# Patient Record
Sex: FEMALE | Race: OTHER | HISPANIC OR LATINO
[De-identification: names, ages, dates, MRNs, and addresses within clinical notes are randomized per-mention and may not be internally consistent; named-entity substitution may affect disease eponyms.]

---

## 2017-10-17 ENCOUNTER — ASOB RESULT (OUTPATIENT)
Age: 47
End: 2017-10-17

## 2017-10-17 ENCOUNTER — APPOINTMENT (OUTPATIENT)
Dept: ANTEPARTUM | Facility: CLINIC | Age: 47
End: 2017-10-17
Payer: COMMERCIAL

## 2017-10-17 PROBLEM — Z00.00 ENCOUNTER FOR PREVENTIVE HEALTH EXAMINATION: Status: ACTIVE | Noted: 2017-10-17

## 2017-10-17 PROCEDURE — 76830 TRANSVAGINAL US NON-OB: CPT

## 2017-10-17 PROCEDURE — 76857 US EXAM PELVIC LIMITED: CPT

## 2019-08-12 ENCOUNTER — TRANSCRIPTION ENCOUNTER (OUTPATIENT)
Age: 49
End: 2019-08-12

## 2019-08-13 ENCOUNTER — OUTPATIENT (OUTPATIENT)
Dept: OUTPATIENT SERVICES | Facility: HOSPITAL | Age: 49
LOS: 1 days | End: 2019-08-13
Payer: COMMERCIAL

## 2019-08-13 DIAGNOSIS — M54.16 RADICULOPATHY, LUMBAR REGION: ICD-10-CM

## 2019-08-13 PROCEDURE — 64483 NJX AA&/STRD TFRM EPI L/S 1: CPT | Mod: LT

## 2019-08-13 PROCEDURE — 76000 FLUOROSCOPY <1 HR PHYS/QHP: CPT

## 2019-08-13 PROCEDURE — 64484 NJX AA&/STRD TFRM EPI L/S EA: CPT | Mod: LT

## 2019-09-16 ENCOUNTER — TRANSCRIPTION ENCOUNTER (OUTPATIENT)
Age: 49
End: 2019-09-16

## 2019-09-17 ENCOUNTER — OUTPATIENT (OUTPATIENT)
Dept: OUTPATIENT SERVICES | Facility: HOSPITAL | Age: 49
LOS: 1 days | Discharge: HOME | End: 2019-09-17
Payer: COMMERCIAL

## 2019-09-17 DIAGNOSIS — M47.816 SPONDYLOSIS WITHOUT MYELOPATHY OR RADICULOPATHY, LUMBAR REGION: ICD-10-CM

## 2019-09-17 PROCEDURE — 64494 INJ PARAVERT F JNT L/S 2 LEV: CPT | Mod: LT

## 2019-09-17 PROCEDURE — 76000 FLUOROSCOPY <1 HR PHYS/QHP: CPT

## 2019-09-17 PROCEDURE — 64495 INJ PARAVERT F JNT L/S 3 LEV: CPT | Mod: LT

## 2019-09-17 PROCEDURE — 64493 INJ PARAVERT F JNT L/S 1 LEV: CPT | Mod: LT

## 2019-10-28 ENCOUNTER — TRANSCRIPTION ENCOUNTER (OUTPATIENT)
Age: 49
End: 2019-10-28

## 2019-10-29 ENCOUNTER — OUTPATIENT (OUTPATIENT)
Dept: OUTPATIENT SERVICES | Facility: HOSPITAL | Age: 49
LOS: 1 days | End: 2019-10-29
Payer: COMMERCIAL

## 2019-10-29 DIAGNOSIS — M54.16 RADICULOPATHY, LUMBAR REGION: ICD-10-CM

## 2019-10-29 PROCEDURE — 64483 NJX AA&/STRD TFRM EPI L/S 1: CPT | Mod: RT

## 2019-10-29 PROCEDURE — 76000 FLUOROSCOPY <1 HR PHYS/QHP: CPT

## 2019-10-29 PROCEDURE — 64484 NJX AA&/STRD TFRM EPI L/S EA: CPT | Mod: RT

## 2019-11-18 PROBLEM — M47.814 THORACIC SPONDYLOSIS: Status: ACTIVE | Noted: 2019-11-18

## 2019-11-18 PROBLEM — Z00.00 ENCOUNTER FOR PREVENTIVE HEALTH EXAMINATION: Status: ACTIVE | Noted: 2019-11-18

## 2019-11-19 ENCOUNTER — APPOINTMENT (OUTPATIENT)
Dept: ORTHOPEDIC SURGERY | Facility: CLINIC | Age: 49
End: 2019-11-19
Payer: COMMERCIAL

## 2019-11-19 VITALS
DIASTOLIC BLOOD PRESSURE: 75 MMHG | BODY MASS INDEX: 27.6 KG/M2 | HEIGHT: 62 IN | HEART RATE: 67 BPM | WEIGHT: 150 LBS | SYSTOLIC BLOOD PRESSURE: 118 MMHG

## 2019-11-19 DIAGNOSIS — Z90.711 ACQUIRED ABSENCE OF UTERUS WITH REMAINING CERVICAL STUMP: ICD-10-CM

## 2019-11-19 DIAGNOSIS — M47.814 SPONDYLOSIS W/OUT MYELOPATHY OR RADICULOPATHY, THORACIC REGION: ICD-10-CM

## 2019-11-19 PROCEDURE — 99204 OFFICE O/P NEW MOD 45 MIN: CPT

## 2019-11-19 PROCEDURE — 72100 X-RAY EXAM L-S SPINE 2/3 VWS: CPT

## 2019-12-10 ENCOUNTER — FORM ENCOUNTER (OUTPATIENT)
Age: 49
End: 2019-12-10

## 2019-12-11 ENCOUNTER — OUTPATIENT (OUTPATIENT)
Dept: OUTPATIENT SERVICES | Facility: HOSPITAL | Age: 49
LOS: 1 days | End: 2019-12-11
Payer: COMMERCIAL

## 2019-12-11 ENCOUNTER — APPOINTMENT (OUTPATIENT)
Dept: MRI IMAGING | Facility: CLINIC | Age: 49
End: 2019-12-11
Payer: COMMERCIAL

## 2019-12-11 DIAGNOSIS — M43.16 SPONDYLOLISTHESIS, LUMBAR REGION: ICD-10-CM

## 2019-12-11 DIAGNOSIS — M48.062 SPINAL STENOSIS, LUMBAR REGION WITH NEUROGENIC CLAUDICATION: ICD-10-CM

## 2019-12-11 PROCEDURE — 72148 MRI LUMBAR SPINE W/O DYE: CPT

## 2019-12-11 PROCEDURE — 72148 MRI LUMBAR SPINE W/O DYE: CPT | Mod: 26

## 2020-01-23 ENCOUNTER — APPOINTMENT (OUTPATIENT)
Dept: ORTHOPEDIC SURGERY | Facility: CLINIC | Age: 50
End: 2020-01-23
Payer: COMMERCIAL

## 2020-01-23 VITALS
HEIGHT: 62 IN | DIASTOLIC BLOOD PRESSURE: 83 MMHG | HEART RATE: 74 BPM | SYSTOLIC BLOOD PRESSURE: 125 MMHG | BODY MASS INDEX: 27.6 KG/M2 | WEIGHT: 150 LBS

## 2020-01-23 PROCEDURE — 99214 OFFICE O/P EST MOD 30 MIN: CPT

## 2020-01-23 PROCEDURE — 72100 X-RAY EXAM L-S SPINE 2/3 VWS: CPT

## 2020-03-05 ENCOUNTER — APPOINTMENT (OUTPATIENT)
Dept: ANTEPARTUM | Facility: CLINIC | Age: 50
End: 2020-03-05
Payer: COMMERCIAL

## 2020-03-05 ENCOUNTER — ASOB RESULT (OUTPATIENT)
Age: 50
End: 2020-03-05

## 2020-03-05 PROCEDURE — 76856 US EXAM PELVIC COMPLETE: CPT | Mod: 59

## 2020-03-05 PROCEDURE — 76830 TRANSVAGINAL US NON-OB: CPT

## 2020-03-09 ENCOUNTER — TRANSCRIPTION ENCOUNTER (OUTPATIENT)
Age: 50
End: 2020-03-09

## 2020-03-10 ENCOUNTER — OUTPATIENT (OUTPATIENT)
Dept: OUTPATIENT SERVICES | Facility: HOSPITAL | Age: 50
LOS: 1 days | End: 2020-03-10
Payer: COMMERCIAL

## 2020-03-10 DIAGNOSIS — M54.16 RADICULOPATHY, LUMBAR REGION: ICD-10-CM

## 2020-03-16 PROCEDURE — 64483 NJX AA&/STRD TFRM EPI L/S 1: CPT | Mod: LT

## 2020-03-16 PROCEDURE — 76000 FLUOROSCOPY <1 HR PHYS/QHP: CPT

## 2020-03-16 PROCEDURE — 64484 NJX AA&/STRD TFRM EPI L/S EA: CPT | Mod: LT

## 2020-06-16 ENCOUNTER — APPOINTMENT (OUTPATIENT)
Dept: ANTEPARTUM | Facility: CLINIC | Age: 50
End: 2020-06-16

## 2020-11-06 ENCOUNTER — APPOINTMENT (OUTPATIENT)
Dept: ORTHOPEDIC SURGERY | Facility: CLINIC | Age: 50
End: 2020-11-06
Payer: MEDICAID

## 2020-11-06 VITALS
DIASTOLIC BLOOD PRESSURE: 83 MMHG | WEIGHT: 150 LBS | HEART RATE: 68 BPM | SYSTOLIC BLOOD PRESSURE: 127 MMHG | BODY MASS INDEX: 27.6 KG/M2 | HEIGHT: 62 IN

## 2020-11-06 VITALS — TEMPERATURE: 96.8 F

## 2020-11-06 PROCEDURE — 99215 OFFICE O/P EST HI 40 MIN: CPT

## 2020-11-06 PROCEDURE — 99072 ADDL SUPL MATRL&STAF TM PHE: CPT

## 2020-12-04 DIAGNOSIS — Z78.9 OTHER SPECIFIED HEALTH STATUS: ICD-10-CM

## 2020-12-09 ENCOUNTER — APPOINTMENT (OUTPATIENT)
Dept: CT IMAGING | Facility: CLINIC | Age: 50
End: 2020-12-09
Payer: MEDICAID

## 2020-12-09 ENCOUNTER — OUTPATIENT (OUTPATIENT)
Dept: OUTPATIENT SERVICES | Facility: HOSPITAL | Age: 50
LOS: 1 days | End: 2020-12-09
Payer: COMMERCIAL

## 2020-12-09 DIAGNOSIS — M47.816 SPONDYLOSIS WITHOUT MYELOPATHY OR RADICULOPATHY, LUMBAR REGION: ICD-10-CM

## 2020-12-09 DIAGNOSIS — M43.16 SPONDYLOLISTHESIS, LUMBAR REGION: ICD-10-CM

## 2020-12-09 DIAGNOSIS — M48.062 SPINAL STENOSIS, LUMBAR REGION WITH NEUROGENIC CLAUDICATION: ICD-10-CM

## 2020-12-09 DIAGNOSIS — Z00.8 ENCOUNTER FOR OTHER GENERAL EXAMINATION: ICD-10-CM

## 2020-12-09 PROCEDURE — 72131 CT LUMBAR SPINE W/O DYE: CPT | Mod: 26

## 2020-12-09 PROCEDURE — 72131 CT LUMBAR SPINE W/O DYE: CPT

## 2020-12-18 ENCOUNTER — OUTPATIENT (OUTPATIENT)
Dept: OUTPATIENT SERVICES | Facility: HOSPITAL | Age: 50
LOS: 1 days | End: 2020-12-18
Payer: COMMERCIAL

## 2020-12-18 ENCOUNTER — APPOINTMENT (OUTPATIENT)
Dept: ORTHOPEDIC SURGERY | Facility: CLINIC | Age: 50
End: 2020-12-18
Payer: MEDICAID

## 2020-12-18 VITALS
TEMPERATURE: 97 F | SYSTOLIC BLOOD PRESSURE: 112 MMHG | RESPIRATION RATE: 20 BRPM | HEIGHT: 62 IN | DIASTOLIC BLOOD PRESSURE: 80 MMHG | WEIGHT: 152.56 LBS | HEART RATE: 70 BPM

## 2020-12-18 VITALS
WEIGHT: 150 LBS | DIASTOLIC BLOOD PRESSURE: 74 MMHG | SYSTOLIC BLOOD PRESSURE: 129 MMHG | HEIGHT: 62 IN | HEART RATE: 66 BPM | BODY MASS INDEX: 27.6 KG/M2

## 2020-12-18 VITALS — TEMPERATURE: 98.4 F

## 2020-12-18 DIAGNOSIS — M43.16 SPONDYLOLISTHESIS, LUMBAR REGION: ICD-10-CM

## 2020-12-18 DIAGNOSIS — Z29.9 ENCOUNTER FOR PROPHYLACTIC MEASURES, UNSPECIFIED: ICD-10-CM

## 2020-12-18 DIAGNOSIS — Z90.710 ACQUIRED ABSENCE OF BOTH CERVIX AND UTERUS: Chronic | ICD-10-CM

## 2020-12-18 DIAGNOSIS — Z01.818 ENCOUNTER FOR OTHER PREPROCEDURAL EXAMINATION: ICD-10-CM

## 2020-12-18 LAB
A1C WITH ESTIMATED AVERAGE GLUCOSE RESULT: 5.8 % — HIGH (ref 4–5.6)
ANION GAP SERPL CALC-SCNC: 7 MMOL/L — SIGNIFICANT CHANGE UP (ref 5–17)
APTT BLD: 34.6 SEC — SIGNIFICANT CHANGE UP (ref 27.5–35.5)
BASOPHILS # BLD AUTO: 0.05 K/UL — SIGNIFICANT CHANGE UP (ref 0–0.2)
BASOPHILS NFR BLD AUTO: 0.7 % — SIGNIFICANT CHANGE UP (ref 0–2)
BLD GP AB SCN SERPL QL: SIGNIFICANT CHANGE UP
BUN SERPL-MCNC: 18 MG/DL — SIGNIFICANT CHANGE UP (ref 8–20)
CALCIUM SERPL-MCNC: 9.6 MG/DL — SIGNIFICANT CHANGE UP (ref 8.6–10.2)
CHLORIDE SERPL-SCNC: 105 MMOL/L — SIGNIFICANT CHANGE UP (ref 98–107)
CO2 SERPL-SCNC: 26 MMOL/L — SIGNIFICANT CHANGE UP (ref 22–29)
CREAT SERPL-MCNC: 0.54 MG/DL — SIGNIFICANT CHANGE UP (ref 0.5–1.3)
EOSINOPHIL # BLD AUTO: 0.1 K/UL — SIGNIFICANT CHANGE UP (ref 0–0.5)
EOSINOPHIL NFR BLD AUTO: 1.5 % — SIGNIFICANT CHANGE UP (ref 0–6)
ESTIMATED AVERAGE GLUCOSE: 120 MG/DL — HIGH (ref 68–114)
GLUCOSE SERPL-MCNC: 96 MG/DL — SIGNIFICANT CHANGE UP (ref 70–99)
HCT VFR BLD CALC: 39.6 % — SIGNIFICANT CHANGE UP (ref 34.5–45)
HGB BLD-MCNC: 12.7 G/DL — SIGNIFICANT CHANGE UP (ref 11.5–15.5)
IMM GRANULOCYTES NFR BLD AUTO: 0.1 % — SIGNIFICANT CHANGE UP (ref 0–1.5)
INR BLD: 1.1 RATIO — SIGNIFICANT CHANGE UP (ref 0.88–1.16)
LYMPHOCYTES # BLD AUTO: 2.8 K/UL — SIGNIFICANT CHANGE UP (ref 1–3.3)
LYMPHOCYTES # BLD AUTO: 41.6 % — SIGNIFICANT CHANGE UP (ref 13–44)
MCHC RBC-ENTMCNC: 29.5 PG — SIGNIFICANT CHANGE UP (ref 27–34)
MCHC RBC-ENTMCNC: 32.1 GM/DL — SIGNIFICANT CHANGE UP (ref 32–36)
MCV RBC AUTO: 92.1 FL — SIGNIFICANT CHANGE UP (ref 80–100)
MONOCYTES # BLD AUTO: 0.46 K/UL — SIGNIFICANT CHANGE UP (ref 0–0.9)
MONOCYTES NFR BLD AUTO: 6.8 % — SIGNIFICANT CHANGE UP (ref 2–14)
MRSA PCR RESULT.: SIGNIFICANT CHANGE UP
NEUTROPHILS # BLD AUTO: 3.31 K/UL — SIGNIFICANT CHANGE UP (ref 1.8–7.4)
NEUTROPHILS NFR BLD AUTO: 49.3 % — SIGNIFICANT CHANGE UP (ref 43–77)
PLATELET # BLD AUTO: 215 K/UL — SIGNIFICANT CHANGE UP (ref 150–400)
POTASSIUM SERPL-MCNC: 4.1 MMOL/L — SIGNIFICANT CHANGE UP (ref 3.5–5.3)
POTASSIUM SERPL-SCNC: 4.1 MMOL/L — SIGNIFICANT CHANGE UP (ref 3.5–5.3)
PROTHROM AB SERPL-ACNC: 12.7 SEC — SIGNIFICANT CHANGE UP (ref 10.6–13.6)
RBC # BLD: 4.3 M/UL — SIGNIFICANT CHANGE UP (ref 3.8–5.2)
RBC # FLD: 13.7 % — SIGNIFICANT CHANGE UP (ref 10.3–14.5)
S AUREUS DNA NOSE QL NAA+PROBE: SIGNIFICANT CHANGE UP
SODIUM SERPL-SCNC: 138 MMOL/L — SIGNIFICANT CHANGE UP (ref 135–145)
WBC # BLD: 6.73 K/UL — SIGNIFICANT CHANGE UP (ref 3.8–10.5)
WBC # FLD AUTO: 6.73 K/UL — SIGNIFICANT CHANGE UP (ref 3.8–10.5)

## 2020-12-18 PROCEDURE — 72100 X-RAY EXAM L-S SPINE 2/3 VWS: CPT

## 2020-12-18 PROCEDURE — 99215 OFFICE O/P EST HI 40 MIN: CPT

## 2020-12-18 PROCEDURE — 99072 ADDL SUPL MATRL&STAF TM PHE: CPT

## 2020-12-18 PROCEDURE — G0463: CPT

## 2020-12-18 RX ORDER — SODIUM CHLORIDE 9 MG/ML
3 INJECTION INTRAMUSCULAR; INTRAVENOUS; SUBCUTANEOUS EVERY 8 HOURS
Refills: 0 | Status: DISCONTINUED | OUTPATIENT
Start: 2021-01-06 | End: 2021-01-06

## 2020-12-18 NOTE — H&P PST ADULT - ASSESSMENT
CAPRINI SCORE [CLOT]    AGE RELATED RISK FACTORS                                                       MOBILITY RELATED FACTORS  [ ] Age 41-60 years                                            (1 Point)                  [ ] Bed rest                                                        (1 Point)  [x ] Age: 61-74 years                                           (2 Points)                 [ ] Plaster cast                                                   (2 Points)  [ ] Age= 75 years                                              (3 Points)                 [ ] Bed bound for more than 72 hours                 (2 Points)    DISEASE RELATED RISK FACTORS                                               GENDER SPECIFIC FACTORS  [ ] Edema in the lower extremities                       (1 Point)                  [ ] Pregnancy                                                     (1 Point)  [x ] Varicose veins                                               (1 Point)                  [ ] Post-partum < 6 weeks                                   (1 Point)             [x ] BMI > 25 Kg/m2                                            (1 Point)                  [ ] Hormonal therapy  or oral contraception          (1 Point)                 [ ] Sepsis (in the previous month)                        (1 Point)                  [ ] History of pregnancy complications                 (1 point)  [ ] Pneumonia or serious lung disease                                               [ ] Unexplained or recurrent                     (1 Point)           (in the previous month)                               (1 Point)  [ ] Abnormal pulmonary function test                     (1 Point)                 SURGERY RELATED RISK FACTORS  [ ] Acute myocardial infarction                              (1 Point)                 [ ]  Section                                             (1 Point)  [ ] Congestive heart failure (in the previous month)  (1 Point)               [ ] Minor surgery                                                  (1 Point)   [ ] Inflammatory bowel disease                             (1 Point)                 [ ] Arthroscopic surgery                                        (2 Points)  [ ] Central venous access                                      (2 Points)                [ x] General surgery lasting more than 45 minutes   (2 Points)       [ ] Stroke (in the previous month)                          (5 Points)               [ ] Elective arthroplasty                                         (5 Points)                                                                                                                                               HEMATOLOGY RELATED FACTORS                                                 TRAUMA RELATED RISK FACTORS  [ ] Prior episodes of VTE                                     (3 Points)                [ ] Fracture of the hip, pelvis, or leg                       (5 Points)  [ ] Positive family history for VTE                         (3 Points)                 [ ] Acute spinal cord injury (in the previous month)  (5 Points)  [ ] Prothrombin 90042 A                                     (3 Points)                 [ ] Paralysis  (less than 1 month)                             (5 Points)  [ ] Factor V Leiden                                             (3 Points)                  [ ] Multiple Trauma within 1 month                        (5 Points)  [ ] Lupus anticoagulants                                     (3 Points)                                                           [ ] Anticardiolipin antibodies                               (3 Points)                                                       [ ] High homocysteine in the blood                      (3 Points)                                             [ ] Other congenital or acquired thrombophilia      (3 Points)                                                [ ] Heparin induced thrombocytopenia                  (3 Points)                                          Total Score [    6 ]    Caprini Score 0 - 2:  Low Risk, No VTE Prophylaxis required for most patients, encourage ambulation  Caprini Score 3 - 6:  At Risk, pharmacologic VTE prophylaxis is indicated for most patients (in the absence of a contraindication)  Caprini Score Greater than or = 7:  High Risk, pharmacologic VTE prophylaxis is indicated for most patients (in the absence of a contraindication)    50  year old female present with lower back pain the radiates down the left leg with numbness to the sole of the foot.  Work up revealed spondylolisthesis in the lumbar region.   She is schedule for L4-L5, L5-S1 posterior instrumented, non instrumented and interbody fusion, laminectomy, L4, L5 and partial laminectomy L3 with Dr. Kingsley on 21  Patient educated on verbal and written pre op instructions

## 2020-12-18 NOTE — H&P PST ADULT - NSICDXPROBLEM_GEN_ALL_CORE_FT
PROBLEM DIAGNOSES  Problem: Spondylolisthesis, lumbar region  Assessment and Plan: L4-L5, L5-S1, Posterior instrumented non - instrumented and interbody fusion, laminectomy, L4-L5 partial laminectomy L3    Problem: Need for prophylactic measure  Assessment and Plan:        PROBLEM DIAGNOSES  Problem: Spondylolisthesis, lumbar region  Assessment and Plan: L4-L5, L5-S1 Posterior instrumented, non - instrumented and interbody fusion, laminectomy, L4-L5 partial laminectomy L3    Problem: Need for prophylactic measure  Assessment and Plan:        PROBLEM DIAGNOSES  Problem: Spondylolisthesis, lumbar region  Assessment and Plan: L4-L5, L5-S1 Posterior instrumented, non - instrumented and interbody fusion laminectomy L4-L5, partial laminectomy L3  Medical clearance     Problem: Need for prophylactic measure  Assessment and Plan: moderate risk, the surgical team will order appropriate VTE prophylaxis

## 2020-12-18 NOTE — H&P PST ADULT - NSHPATTENDINGPLANDISCUSS_GEN_ALL_CORE
pt re a goal of improving LBP and radic. Pt aware of adjacent level dz, revision surgery, persistent LBP / s/sx

## 2020-12-18 NOTE — H&P PST ADULT - HISTORY OF PRESENT ILLNESS
50  year old female present with lower back pain the radiates down the left leg with numbness to the sole of the foot.  Patient reports symptoms have come and go over the last two years. She report pain has failed to improve with conservative treatment measure such as physical therapy oral steroid and steroid spinal injections.  She state pain is progressively getting worst rate pain 8/10  and state nothing seems to make it better.  She is schedule for L4-L5, L5-S1 posterior instrumented, non instrumented and interbody fusion, laminectomy, L4, L5 and partial laminectomy L3 with Dr. Kingsley on 1/6/21 50  year old female present with lower back pain the radiates down the left leg with numbness to the sole of the foot. Work up revealed spondylolisthesis in the lumbar region.  Patient reports symptoms have come and go over the last two years. She report pain has failed to improve with conservative treatment measure such as physical therapy oral steroid and steroid spinal injections. she reports having 7 spinal injections with temporary relief in symptoms.  She state pain is progressively getting worst rate pain 8/10  and state nothing seems to make it better.  She is schedule for L4-L5, L5-S1 posterior instrumented, non instrumented and interbody fusion, laminectomy, L4, L5 and partial laminectomy L3 with Dr. Kingsley on 1/6/21

## 2020-12-18 NOTE — PATIENT PROFILE ADULT - NSPROHMSYMPCOND_GEN_A_NUR
NP, Rema Guardado, instructed pt verbally via video  and in writing in Yakut on pre-op instructions/teaching, tips for safer surgery, pain management scale, pre-surgical infection prevention instructions, MRSA/MSSA instructions, Covid swab appt info (1/3), spine book given to pt and verbalized understanding of all instructions given.

## 2020-12-18 NOTE — PATIENT PROFILE ADULT - VISION (WITH CORRECTIVE LENSES IF THE PATIENT USUALLY WEARS THEM):
Glasses - progressive lenses/Partially impaired: cannot see medication labels or newsprint, but can see obstacles in path, and the surrounding layout; can count fingers at arm's length

## 2020-12-18 NOTE — PATIENT PROFILE ADULT - LANGUAGE ASSISTANCE NEEDED
ID # [id number] [facility] Language Line Services - Video/Yes-Patient/Caregiver accepts free interpretation services...

## 2020-12-23 RX ORDER — CELECOXIB 200 MG/1
200 CAPSULE ORAL ONCE
Refills: 0 | Status: COMPLETED | OUTPATIENT
Start: 2021-01-06 | End: 2021-01-06

## 2020-12-23 RX ORDER — BUPIVACAINE 13.3 MG/ML
20 INJECTION, SUSPENSION, LIPOSOMAL INFILTRATION ONCE
Refills: 0 | Status: DISCONTINUED | OUTPATIENT
Start: 2021-01-06 | End: 2021-01-06

## 2020-12-23 RX ORDER — CEFAZOLIN SODIUM 1 G
2000 VIAL (EA) INJECTION ONCE
Refills: 0 | Status: DISCONTINUED | OUTPATIENT
Start: 2021-01-06 | End: 2021-01-06

## 2020-12-23 RX ORDER — ACETAMINOPHEN 500 MG
975 TABLET ORAL ONCE
Refills: 0 | Status: COMPLETED | OUTPATIENT
Start: 2021-01-06 | End: 2021-01-06

## 2020-12-23 RX ORDER — APREPITANT 80 MG/1
40 CAPSULE ORAL ONCE
Refills: 0 | Status: COMPLETED | OUTPATIENT
Start: 2021-01-06 | End: 2021-01-06

## 2021-01-02 DIAGNOSIS — Z01.818 ENCOUNTER FOR OTHER PREPROCEDURAL EXAMINATION: ICD-10-CM

## 2021-01-03 ENCOUNTER — APPOINTMENT (OUTPATIENT)
Dept: DISASTER EMERGENCY | Facility: CLINIC | Age: 51
End: 2021-01-03

## 2021-01-04 LAB — SARS-COV-2 N GENE NPH QL NAA+PROBE: NOT DETECTED

## 2021-01-05 ENCOUNTER — TRANSCRIPTION ENCOUNTER (OUTPATIENT)
Age: 51
End: 2021-01-05

## 2021-01-06 ENCOUNTER — INPATIENT (INPATIENT)
Facility: HOSPITAL | Age: 51
LOS: 3 days | Discharge: HOME | DRG: 454 | End: 2021-01-10
Attending: ORTHOPAEDIC SURGERY | Admitting: ORTHOPAEDIC SURGERY
Payer: COMMERCIAL

## 2021-01-06 ENCOUNTER — TRANSCRIPTION ENCOUNTER (OUTPATIENT)
Age: 51
End: 2021-01-06

## 2021-01-06 ENCOUNTER — APPOINTMENT (OUTPATIENT)
Dept: ORTHOPEDIC SURGERY | Facility: HOSPITAL | Age: 51
End: 2021-01-06

## 2021-01-06 VITALS
OXYGEN SATURATION: 99 % | TEMPERATURE: 97 F | RESPIRATION RATE: 16 BRPM | DIASTOLIC BLOOD PRESSURE: 77 MMHG | SYSTOLIC BLOOD PRESSURE: 108 MMHG | HEART RATE: 99 BPM | WEIGHT: 152.56 LBS | HEIGHT: 62 IN

## 2021-01-06 DIAGNOSIS — Z90.710 ACQUIRED ABSENCE OF BOTH CERVIX AND UTERUS: Chronic | ICD-10-CM

## 2021-01-06 DIAGNOSIS — M48.062 SPINAL STENOSIS, LUMBAR REGION WITH NEUROGENIC CLAUDICATION: ICD-10-CM

## 2021-01-06 LAB
ABO RH CONFIRMATION: SIGNIFICANT CHANGE UP
GLUCOSE BLDC GLUCOMTR-MCNC: 102 MG/DL — HIGH (ref 70–99)

## 2021-01-06 PROCEDURE — 22633 ARTHRD CMBN 1NTRSPC LUMBAR: CPT | Mod: AS

## 2021-01-06 PROCEDURE — 22853 INSJ BIOMECHANICAL DEVICE: CPT

## 2021-01-06 PROCEDURE — 99222 1ST HOSP IP/OBS MODERATE 55: CPT

## 2021-01-06 PROCEDURE — 22842 INSERT SPINE FIXATION DEVICE: CPT | Mod: AS

## 2021-01-06 PROCEDURE — 22634 ARTHRD CMBN 1NTRSPC EA ADDL: CPT | Mod: AS

## 2021-01-06 PROCEDURE — 22842 INSERT SPINE FIXATION DEVICE: CPT

## 2021-01-06 PROCEDURE — 22853 INSJ BIOMECHANICAL DEVICE: CPT | Mod: AS

## 2021-01-06 PROCEDURE — 22633 ARTHRD CMBN 1NTRSPC LUMBAR: CPT

## 2021-01-06 PROCEDURE — 22634 ARTHRD CMBN 1NTRSPC EA ADDL: CPT

## 2021-01-06 RX ORDER — QUETIAPINE FUMARATE 200 MG/1
25 TABLET, FILM COATED ORAL AT BEDTIME
Refills: 0 | Status: DISCONTINUED | OUTPATIENT
Start: 2021-01-06 | End: 2021-01-10

## 2021-01-06 RX ORDER — GABAPENTIN 400 MG/1
300 CAPSULE ORAL THREE TIMES A DAY
Refills: 0 | Status: DISCONTINUED | OUTPATIENT
Start: 2021-01-06 | End: 2021-01-10

## 2021-01-06 RX ORDER — ONDANSETRON 8 MG/1
4 TABLET, FILM COATED ORAL ONCE
Refills: 0 | Status: DISCONTINUED | OUTPATIENT
Start: 2021-01-06 | End: 2021-01-06

## 2021-01-06 RX ORDER — MAGNESIUM HYDROXIDE 400 MG/1
30 TABLET, CHEWABLE ORAL EVERY 12 HOURS
Refills: 0 | Status: DISCONTINUED | OUTPATIENT
Start: 2021-01-06 | End: 2021-01-10

## 2021-01-06 RX ORDER — ACETAMINOPHEN 500 MG
975 TABLET ORAL EVERY 6 HOURS
Refills: 0 | Status: DISCONTINUED | OUTPATIENT
Start: 2021-01-06 | End: 2021-01-10

## 2021-01-06 RX ORDER — METHOCARBAMOL 500 MG/1
750 TABLET, FILM COATED ORAL THREE TIMES A DAY
Refills: 0 | Status: DISCONTINUED | OUTPATIENT
Start: 2021-01-06 | End: 2021-01-10

## 2021-01-06 RX ORDER — SODIUM CHLORIDE 9 MG/ML
1000 INJECTION, SOLUTION INTRAVENOUS
Refills: 0 | Status: DISCONTINUED | OUTPATIENT
Start: 2021-01-06 | End: 2021-01-06

## 2021-01-06 RX ORDER — ASPIRIN/CALCIUM CARB/MAGNESIUM 324 MG
325 TABLET ORAL DAILY
Refills: 0 | Status: DISCONTINUED | OUTPATIENT
Start: 2021-01-07 | End: 2021-01-10

## 2021-01-06 RX ORDER — SODIUM CHLORIDE 9 MG/ML
1000 INJECTION, SOLUTION INTRAVENOUS
Refills: 0 | Status: DISCONTINUED | OUTPATIENT
Start: 2021-01-06 | End: 2021-01-10

## 2021-01-06 RX ORDER — NALOXONE HYDROCHLORIDE 4 MG/.1ML
0.1 SPRAY NASAL
Refills: 0 | Status: DISCONTINUED | OUTPATIENT
Start: 2021-01-06 | End: 2021-01-10

## 2021-01-06 RX ORDER — ONDANSETRON 8 MG/1
4 TABLET, FILM COATED ORAL EVERY 6 HOURS
Refills: 0 | Status: DISCONTINUED | OUTPATIENT
Start: 2021-01-06 | End: 2021-01-10

## 2021-01-06 RX ORDER — FENTANYL CITRATE 50 UG/ML
50 INJECTION INTRAVENOUS
Refills: 0 | Status: DISCONTINUED | OUTPATIENT
Start: 2021-01-06 | End: 2021-01-06

## 2021-01-06 RX ORDER — CEFAZOLIN SODIUM 1 G
2000 VIAL (EA) INJECTION
Refills: 0 | Status: COMPLETED | OUTPATIENT
Start: 2021-01-06 | End: 2021-01-07

## 2021-01-06 RX ORDER — CELECOXIB 200 MG/1
200 CAPSULE ORAL
Refills: 0 | Status: DISCONTINUED | OUTPATIENT
Start: 2021-01-07 | End: 2021-01-10

## 2021-01-06 RX ORDER — SENNA PLUS 8.6 MG/1
2 TABLET ORAL AT BEDTIME
Refills: 0 | Status: DISCONTINUED | OUTPATIENT
Start: 2021-01-06 | End: 2021-01-10

## 2021-01-06 RX ORDER — FENTANYL CITRATE 50 UG/ML
30 INJECTION INTRAVENOUS
Refills: 0 | Status: DISCONTINUED | OUTPATIENT
Start: 2021-01-06 | End: 2021-01-08

## 2021-01-06 RX ADMIN — FENTANYL CITRATE 50 MICROGRAM(S): 50 INJECTION INTRAVENOUS at 15:14

## 2021-01-06 RX ADMIN — Medication 975 MILLIGRAM(S): at 17:52

## 2021-01-06 RX ADMIN — CELECOXIB 200 MILLIGRAM(S): 200 CAPSULE ORAL at 08:17

## 2021-01-06 RX ADMIN — FENTANYL CITRATE 30 MILLILITER(S): 50 INJECTION INTRAVENOUS at 15:17

## 2021-01-06 RX ADMIN — Medication 975 MILLIGRAM(S): at 23:26

## 2021-01-06 RX ADMIN — METHOCARBAMOL 750 MILLIGRAM(S): 500 TABLET, FILM COATED ORAL at 21:41

## 2021-01-06 RX ADMIN — SODIUM CHLORIDE 100 MILLILITER(S): 9 INJECTION, SOLUTION INTRAVENOUS at 17:53

## 2021-01-06 RX ADMIN — Medication 975 MILLIGRAM(S): at 08:17

## 2021-01-06 RX ADMIN — APREPITANT 40 MILLIGRAM(S): 80 CAPSULE ORAL at 08:17

## 2021-01-06 RX ADMIN — SENNA PLUS 2 TABLET(S): 8.6 TABLET ORAL at 21:41

## 2021-01-06 RX ADMIN — FENTANYL CITRATE 30 MILLILITER(S): 50 INJECTION INTRAVENOUS at 18:02

## 2021-01-06 RX ADMIN — QUETIAPINE FUMARATE 25 MILLIGRAM(S): 200 TABLET, FILM COATED ORAL at 21:42

## 2021-01-06 RX ADMIN — Medication 100 MILLIGRAM(S): at 17:52

## 2021-01-06 RX ADMIN — GABAPENTIN 300 MILLIGRAM(S): 400 CAPSULE ORAL at 21:41

## 2021-01-06 RX ADMIN — FENTANYL CITRATE 30 MILLILITER(S): 50 INJECTION INTRAVENOUS at 19:37

## 2021-01-06 RX ADMIN — SODIUM CHLORIDE 75 MILLILITER(S): 9 INJECTION, SOLUTION INTRAVENOUS at 15:17

## 2021-01-06 RX ADMIN — FENTANYL CITRATE 30 MILLILITER(S): 50 INJECTION INTRAVENOUS at 16:32

## 2021-01-06 NOTE — BRIEF OPERATIVE NOTE - NSICDXBRIEFPROCEDURE_GEN_ALL_CORE_FT
PROCEDURES:  Lumbar fusion 06-Jan-2021 09:18:17  Luis Felipe Null  
PROCEDURES:  Lumbar fusion 06-Jan-2021 09:18:17  Luis Felipe Null

## 2021-01-06 NOTE — PHYSICAL THERAPY INITIAL EVALUATION ADULT - PHYSICAL ASSIST/NONPHYSICAL ASSIST: SUPINE/SIT, REHAB EVAL
required increased assistance to get bilateral LE's out of bed/assume upright sitting at EOB position + verbal cues for proper hand placement. pt required verbal cues re spinal precautions/1 person assist

## 2021-01-06 NOTE — BRIEF OPERATIVE NOTE - NSICDXBRIEFPREOP_GEN_ALL_CORE_FT
PRE-OP DIAGNOSIS:  Spondylolisthesis at L4-L5 level 06-Jan-2021 09:18:30  Luis Felipe Null  
PRE-OP DIAGNOSIS:  Spondylolisthesis at L4-L5 level 06-Jan-2021 09:18:30  Luis Felipe Null

## 2021-01-06 NOTE — PROGRESS NOTE ADULT - SUBJECTIVE AND OBJECTIVE BOX
ULPE [id number]    50yFemale  Spinal stenosis of lumbar region with neurogenic claudication    FH: type 2 diabetes    Fibroid, uterine    Spondylolisthesis of lumbar region    Spondylolisthesis at L4-L5 level    Spondylolisthesis, lumbar region    Need for prophylactic measure    Lumbar fusion    S/P hysterectomy    SPINAL STENOSIS, LUMBAR REGION      STATUS POST:  lumbar laminectomy L4, L5/posterior instrumented, non instrumented and interbody fusion for lumbar stenosis with neurogenic claudication, back pain respectively  SUBJECTIVE: Patient seen and examined doing well    Back Pain controlled, lower extremity pain resolving    OBJECTIVE:   T(C): 36.3 (01-06-21 @ 07:52), Max: 36.3 (01-06-21 @ 07:52)  HR: 99 (01-06-21 @ 07:52) (99 - 99)  BP: 108/77 (01-06-21 @ 07:52) (108/77 - 108/77)  RR: 16 (01-06-21 @ 07:52) (16 - 16)  SpO2: 99% (01-06-21 @ 07:52) (99% - 99%)   Constitutional:Pleasant in no acute distress  Psych:A&Ox3  Abdominal: soft and supple non distended  Lymphatics: no pretibial pitting edema  Spine:          Dressing:  clean/dry/intact                            IFTIKHAR drain in place, patent               Sensation:            Upper extremity          grossly intact manually          Lower extremity           grossly intact manually                               Motor:         Lower extremity                      HF(l2)   KE(l3)    TA(l4)   EHL(l5)  GS(s1)                                                 R        5/5        5/5        5/5       5/5         5/5                                               L         5/5        5/5       5/5       5/5          5/5                                                        [x] warm well perfused; capillary refill <3 seconds                A/P : 50yFemale   S/P lumbar laminectomy and fusion as above  POD#0  -    Pain control -PCA x 24-36 hours then oxycodone.  multimodal.  avoid NSAIDS x 6 weeks.   -    DVT ppx: [ x]SCDs[ x] Pharmacolgic   mg once daily x 28 days post op  -    Periop abx:  Ancef [x ]    -    Check AM labs    -    Monitor Drain Output, can discontinue drain when output equal or less than 10 cc/hr/12 hr.  change dressing when drain pulled and as needed, honeycomb dressing.    -  Resume home meds as appropriate  -PT WBAT, balance and gait  - LSO with OOB not ordered/not mandatory.  Ortho/PT team can reevaluate possible benefit for additional external support daily, in post op period.   -medical follow up- Dr Sanchez/Hospitalist service  - probable home 3-5 days

## 2021-01-06 NOTE — PHYSICAL THERAPY INITIAL EVALUATION ADULT - ADDITIONAL COMMENTS
Pt lives with two sons (30 and 26 y.o) in a private home with no KISHA, no stairs inside and bed&bath on one level. Pt's PLOF was independent in all ADLs/ambulation without an Assistive Device and was working and driving PTA. Pt has no DME at home. At this time, RW, commode and shower chair DME are recommended upon d/c.

## 2021-01-06 NOTE — CONSULT NOTE ADULT - ASSESSMENT
50  year old [place]arag[place] speaking female with Covid illness in April 2020 recovered at home, insomnia on seroquel with c/o chronic lower back pain the radiates down the left leg with numbness to the sole of the foot 2/2 spondylolisthesis in the lumbar region with failed conservative medical therapies limiting her daily activities presented for an elective Lumbar laminectomy and fusion. She is now s/p  Surgery.    # lumbar radiculopahty - s/p Lumbar laminectomy & fusion   Pain control   Bowel regimen   Incentive spirometry  DVT px - per ortho   PT    # Insomnia - on Seroqul 25mg daily HS   # h/o Covid pneumonia in 4/2020     Labs in am   Will follow

## 2021-01-06 NOTE — BRIEF OPERATIVE NOTE - OPERATION/FINDINGS
I personally assisted all aspects of positioning prone for a posterior lumbar approach on a modular Magdy table. Lumbar spine was cleansed with Betadine by me then cleansed with DuraPrep by RN.  I marked operative vertebrae with the use of C-arm.  I participated in a positioning of blue drapes and ioban and participated in operative timeout.  A longitudinal incision was placed over the L3-L5 spinous processes, 20 mL of 1% lidocaine local anesthetic was placed. I assisted in dissection to the spinous processes then bilateral lamina at L4 bilaterally at which time fluoroscopic imaging confirmed the L4 spinous process.  I then assisted with dissection on the patient’s right, completely exposing the L3-L4, L4-L5 facet, transverse process of L3, L4, L5, mammillary process as well as the pars of the L3 through L5 region and caudal aspect of the L2-L3 facet.  During screw placement I performed the millivolt potential analysis for each screw, ensuring acceptable values. After screw insertion, I assisted annulotomy and subsequent placement of biomechanical grafts in the disc space at L4-L5.  I assisted with placement of posterolateral graft. Rods were then placed with my assistance and I confirmed that pedicle screws were appropriately tightened and torqued.  I provided visualization and assisted with hemostasis throughout the procedure by using sequential retraction, continuous suction, utilization of Surgi-James and dry gel foam, packing with Ray-Nasir, and Bovie cauterization.  I made drain incision cranial to surgical incision, and placed HV drain. The surgical area was irrigated copiously.   I assisted in Deep fascial closure was conducted with 0 Vicryl.  I personally performed closure of superficial fascia with 2-0 Vicryl, Monocryl, Dermabond, longitudinal Steri-Strips, Mepilex then Island Dressing.  Xeroform,  2 x 2, Tegaderm was placed over the drain site. Placed Marcaine 30 cc local anesthetic.
spondylolisthesis

## 2021-01-06 NOTE — PHYSICAL THERAPY INITIAL EVALUATION ADULT - PHYSICAL ASSIST/NONPHYSICAL ASSIST: SIT/SUPINE, REHAB EVAL
required increased assistance  to help get bilateral LE's into bed/assume proper semi-samayoa position + verbal cues for proper hand placement. pt required verbal cues re spinal precautions/1 person assist

## 2021-01-06 NOTE — PHYSICAL THERAPY INITIAL EVALUATION ADULT - DISCHARGE DISPOSITION, PT EVAL
home with assist/supervision prn for safety, RW and outpatient PT when cleared by MD pending progress, as pt is an increased falls risk, deeming pt unsafe to return home at this time.

## 2021-01-06 NOTE — PHYSICAL THERAPY INITIAL EVALUATION ADULT - GENERAL OBSERVATIONS, REHAB EVAL
Pt received on 2BRK, RN Mylene armendariz'ed pt for PT. pt is [place] speaking, treating PT spoke [place]. pt observed semi-samayoa in bed with IV, PCA pump, IFTIKHAR bulb drain x 1, radford, VCBs, pleasant, cooperative, A&O and c/o 7/10 LBP pre + 2-3/10 LBP during and post eval

## 2021-01-06 NOTE — CONSULT NOTE ADULT - SUBJECTIVE AND OBJECTIVE BOX
chief complaint of back pain and leg pain with walking   [employer] translation services used on phone     HPI:  50  year old Wolof speaking female with Covid illness in April 2020 recovered at home, insomnia on seroquel with c/o chronic lower back pain the radiates down the left leg with numbness to the sole of the foot 2/2 spondylolisthesis in the lumbar region with failed conservative medical therapies limiting her daily activities presented for an elective Lumbar laminectomy and fusion. She is now s/p  Surgery POD#0, seen in PACU, c/o pain in her back. denies any nausea.       PAST MEDICAL & SURGICAL HISTORY:  Fibroid, uterine  S/P hysterectomy        Social History:  Tabacco - denies    ETOH - denies  Illicit drug abuse - denies    FAMILY HISTORY:  FH: type 2 diabetes  mother        Allergies    No Known Allergies    Intolerances        HOME MEDICATIONS :   Home Medications:  seroquel 50mg 1/2tab HS      REVIEW OF SYSTEMS:    CONSTITUTIONAL: No fever, or fatigue  EYES: No eye pain, visual disturbances, or discharge  NECK: No pain or stiffness  RESPIRATORY: No cough, wheezing, chills or hemoptysis; No shortness of breath  CARDIOVASCULAR: No chest pain, palpitations, dizziness, or leg swelling  GASTROINTESTINAL: No abdominal or epigastric pain. No nausea, vomiting,   GENITOURINARY: No dysuria, frequency, hematuria, or incontinence  NEUROLOGICAL: No headaches, memory loss, loss of strength, numbness, or tremors  SKIN: No itching, burning, rashes, or lesions   LYMPH NODES: No enlarged glands  ENDOCRINE: No heat or cold intolerance; No hair loss  MUSCULOSKELETAL:     MEDICATIONS  (STANDING):  ceFAZolin   IVPB 2000 milliGRAM(s) IV Intermittent <User Schedule>  dextrose 5% + sodium chloride 0.45%. 1000 milliLiter(s) (100 mL/Hr) IV Continuous <Continuous>  fentaNYL PCA (50 MICROgram(s)/mL) 30 milliLiter(s) PCA Continuous PCA Continuous  lactated ringers. 1000 milliLiter(s) (75 mL/Hr) IV Continuous <Continuous>  QUEtiapine 25 milliGRAM(s) Oral at bedtime    MEDICATIONS  (PRN):  fentaNYL    Injectable 50 MICROGram(s) IV Push every 5 minutes PRN Severe Pain (7 - 10)  naloxone Injectable 0.1 milliGRAM(s) IV Push every 3 minutes PRN For ANY of the following changes in patient status:  A. RR LESS THAN 10 breaths per minute, B. Oxygen saturation LESS THAN 90%, C. Sedation score of 6  ondansetron Injectable 4 milliGRAM(s) IV Push every 6 hours PRN Nausea  ondansetron Injectable 4 milliGRAM(s) IV Push once PRN Nausea and/or Vomiting      Vital Signs Last 24 Hrs  T(C): 37.2 (06 Jan 2021 14:37), Max: 37.2 (06 Jan 2021 14:37)  T(F): 98.9 (06 Jan 2021 14:37), Max: 98.9 (06 Jan 2021 14:37)  HR: 97 (06 Jan 2021 15:30) (97 - 118)  BP: 112/60 (06 Jan 2021 15:30) (90/67 - 123/65)  BP(mean): --  RR: 14 (06 Jan 2021 15:30) (13 - 21)  SpO2: 100% (06 Jan 2021 15:30) (99% - 100%)    PHYSICAL EXAM:    GENERAL: NAD, well-groomed, well-developed  HEAD:  Atraumatic, Normocephalic  EYES: EOMI, PERRLA, conjunctiva and sclera clear  NECK: Supple, No JVD  NERVOUS SYSTEM:  Alert & Oriented X3, Good concentration;  CHEST/LUNG: CTA  b/l,  no rales, rhonchi  HEART: Regular rate and rhythm; No murmurs  ABDOMEN: Soft, Nontender, Nondistended; Bowel sounds present  EXTREMITIES:  No clubbing, cyanosis, or edema ,         LABS:  reviewed       RADIOLOGY & ADDITIONAL STUDIES:    office notes from PMD reviewed

## 2021-01-07 LAB
ANION GAP SERPL CALC-SCNC: 10 MMOL/L — SIGNIFICANT CHANGE UP (ref 5–17)
BASOPHILS # BLD AUTO: 0.01 K/UL — SIGNIFICANT CHANGE UP (ref 0–0.2)
BASOPHILS NFR BLD AUTO: 0.1 % — SIGNIFICANT CHANGE UP (ref 0–2)
BUN SERPL-MCNC: 11 MG/DL — SIGNIFICANT CHANGE UP (ref 8–20)
CALCIUM SERPL-MCNC: 8.7 MG/DL — SIGNIFICANT CHANGE UP (ref 8.6–10.2)
CHLORIDE SERPL-SCNC: 108 MMOL/L — HIGH (ref 98–107)
CO2 SERPL-SCNC: 22 MMOL/L — SIGNIFICANT CHANGE UP (ref 22–29)
CREAT SERPL-MCNC: 0.48 MG/DL — LOW (ref 0.5–1.3)
EOSINOPHIL # BLD AUTO: 0 K/UL — SIGNIFICANT CHANGE UP (ref 0–0.5)
EOSINOPHIL NFR BLD AUTO: 0 % — SIGNIFICANT CHANGE UP (ref 0–6)
GLUCOSE SERPL-MCNC: 130 MG/DL — HIGH (ref 70–99)
HCT VFR BLD CALC: 30.1 % — LOW (ref 34.5–45)
HGB BLD-MCNC: 9.9 G/DL — LOW (ref 11.5–15.5)
IMM GRANULOCYTES NFR BLD AUTO: 0.4 % — SIGNIFICANT CHANGE UP (ref 0–1.5)
LYMPHOCYTES # BLD AUTO: 1.25 K/UL — SIGNIFICANT CHANGE UP (ref 1–3.3)
LYMPHOCYTES # BLD AUTO: 9.5 % — LOW (ref 13–44)
MCHC RBC-ENTMCNC: 30.3 PG — SIGNIFICANT CHANGE UP (ref 27–34)
MCHC RBC-ENTMCNC: 32.9 GM/DL — SIGNIFICANT CHANGE UP (ref 32–36)
MCV RBC AUTO: 92 FL — SIGNIFICANT CHANGE UP (ref 80–100)
MONOCYTES # BLD AUTO: 0.74 K/UL — SIGNIFICANT CHANGE UP (ref 0–0.9)
MONOCYTES NFR BLD AUTO: 5.6 % — SIGNIFICANT CHANGE UP (ref 2–14)
NEUTROPHILS # BLD AUTO: 11.15 K/UL — HIGH (ref 1.8–7.4)
NEUTROPHILS NFR BLD AUTO: 84.4 % — HIGH (ref 43–77)
PLATELET # BLD AUTO: 186 K/UL — SIGNIFICANT CHANGE UP (ref 150–400)
POTASSIUM SERPL-MCNC: 4.5 MMOL/L — SIGNIFICANT CHANGE UP (ref 3.5–5.3)
POTASSIUM SERPL-SCNC: 4.5 MMOL/L — SIGNIFICANT CHANGE UP (ref 3.5–5.3)
RBC # BLD: 3.27 M/UL — LOW (ref 3.8–5.2)
RBC # FLD: 13.8 % — SIGNIFICANT CHANGE UP (ref 10.3–14.5)
SODIUM SERPL-SCNC: 139 MMOL/L — SIGNIFICANT CHANGE UP (ref 135–145)
WBC # BLD: 13.2 K/UL — HIGH (ref 3.8–10.5)
WBC # FLD AUTO: 13.2 K/UL — HIGH (ref 3.8–10.5)

## 2021-01-07 PROCEDURE — 99232 SBSQ HOSP IP/OBS MODERATE 35: CPT

## 2021-01-07 RX ORDER — SODIUM CHLORIDE 9 MG/ML
1000 INJECTION INTRAMUSCULAR; INTRAVENOUS; SUBCUTANEOUS ONCE
Refills: 0 | Status: COMPLETED | OUTPATIENT
Start: 2021-01-07 | End: 2021-01-07

## 2021-01-07 RX ADMIN — METHOCARBAMOL 750 MILLIGRAM(S): 500 TABLET, FILM COATED ORAL at 05:58

## 2021-01-07 RX ADMIN — CELECOXIB 200 MILLIGRAM(S): 200 CAPSULE ORAL at 18:18

## 2021-01-07 RX ADMIN — Medication 325 MILLIGRAM(S): at 12:11

## 2021-01-07 RX ADMIN — Medication 975 MILLIGRAM(S): at 18:17

## 2021-01-07 RX ADMIN — Medication 975 MILLIGRAM(S): at 05:58

## 2021-01-07 RX ADMIN — Medication 100 MILLIGRAM(S): at 01:29

## 2021-01-07 RX ADMIN — METHOCARBAMOL 750 MILLIGRAM(S): 500 TABLET, FILM COATED ORAL at 21:30

## 2021-01-07 RX ADMIN — GABAPENTIN 300 MILLIGRAM(S): 400 CAPSULE ORAL at 21:30

## 2021-01-07 RX ADMIN — QUETIAPINE FUMARATE 25 MILLIGRAM(S): 200 TABLET, FILM COATED ORAL at 21:30

## 2021-01-07 RX ADMIN — SODIUM CHLORIDE 1000 MILLILITER(S): 9 INJECTION INTRAMUSCULAR; INTRAVENOUS; SUBCUTANEOUS at 15:50

## 2021-01-07 RX ADMIN — CELECOXIB 200 MILLIGRAM(S): 200 CAPSULE ORAL at 05:58

## 2021-01-07 RX ADMIN — FENTANYL CITRATE 30 MILLILITER(S): 50 INJECTION INTRAVENOUS at 19:19

## 2021-01-07 RX ADMIN — Medication 975 MILLIGRAM(S): at 23:10

## 2021-01-07 RX ADMIN — SENNA PLUS 2 TABLET(S): 8.6 TABLET ORAL at 21:30

## 2021-01-07 RX ADMIN — GABAPENTIN 300 MILLIGRAM(S): 400 CAPSULE ORAL at 05:58

## 2021-01-07 RX ADMIN — Medication 975 MILLIGRAM(S): at 12:07

## 2021-01-07 NOTE — DISCHARGE NOTE PROVIDER - HOSPITAL COURSE
Patient was admitted for elective posterior lumbar laminectomy on 1/6 for failed conservative treatment of persistent lower back pain, The hospital post operative course was uneventful.  The surgical dressing was changed and the drain was removed uneventfully.   PT/OT worked with patient for gait training.   Chronic medical conditions were treated during the hospital stay. Pain was now adequately controlled and patient was discharged home in stable condition. Patient was admitted for elective posterior lumbar laminectomy on 1/6 for failed conservative treatment of persistent lower back pain, The hospital post operative course was uneventful however patient did need radford catheter reinserted for urinary retention on 1/8/20. Radford was removed on 1/10/20 and patient passed trial of void. The surgical dressing was changed and the drain was removed uneventfully.   PT/OT worked with patient for gait training.   Chronic medical conditions were treated during the hospital stay. Pain was now adequately controlled and patient was discharged home in stable condition.

## 2021-01-07 NOTE — DISCHARGE NOTE PROVIDER - NSDCMRMEDTOKEN_GEN_ALL_CORE_FT
acetaminophen 325 mg oral tablet: 3 tab(s) orally every 8 hours, As Needed  aspirin 325 mg oral delayed release tablet: 1 tab(s) orally once a day x 4 weeks postop - last dose on 2/6/20  MDD:1  methocarbamol 750 mg oral tablet: 1 tab(s) orally every 8 hours, As Needed -for muscle spasm  PRN MDD:3   omeprazole 20 mg oral delayed release capsule: 1 cap(s) orally once a day while taking aspirin for DVT propx - last dose on 2/6/20 MDD:1  oxyCODONE 5 mg oral tablet: 1 tab(s) orally every 4-6 hours PRN severe mod-severe painMDD:4  Senna S 50 mg-8.6 mg oral tablet: 2 tab(s) orally once a day (at bedtime), As Needed -for constipation MDD:2

## 2021-01-07 NOTE — PROGRESS NOTE ADULT - SUBJECTIVE AND OBJECTIVE BOX
s/p lumbar laminectomy pod #1.  Pt c/o pain to incision area.  Nno numbness/tingling to b/l UE/LE.  PT did well with PT yesterday, has not had PT today.  +PCA    Vital Signs Last 24 Hrs  T(C): 36.6 (07 Jan 2021 07:24), Max: 37.2 (06 Jan 2021 14:37)  T(F): 97.8 (07 Jan 2021 07:24), Max: 98.9 (06 Jan 2021 14:37)  HR: 73 (07 Jan 2021 07:24) (73 - 118)  BP: 97/59 (07 Jan 2021 07:24) (90/67 - 123/65)  BP(mean): --  RR: 18 (07 Jan 2021 07:24) (13 - 21)  SpO2: 96% (07 Jan 2021 07:24) (95% - 100%)      Abdominal: soft and supple non distended  Lymphatics: no pretibial pitting edema  Spine:          Dressing:  clean/dry/intact                            IFTIKHAR drain in place, patent with 140cc output over night                Sensation:            Upper extremity          grossly intact manually          Lower extremity           grossly intact manually                               Motor:         Lower extremity                      HF(l2)   KE(l3)    TA(l4)   EHL(l5)  GS(s1)                                                 R        5/5        5/5        5/5       5/5         5/5                                               L         5/5        5/5       5/5       5/5          5/5                                                        [x] warm well perfused; capillary refill <3 seconds         A/P : 50yFemale   S/P lumbar laminectomy and fusion as above  POD#1  -    Pain control -PCA x 24-36 hours then oxycodone.  multimodal.  avoid NSAIDS x 6 weeks.   -    DVT ppx: [ x]SCDs[ x] Pharmacolgic   mg once daily x 28 days post op  -    AM labs pending   -    drain output 140 cc overnight, can discontinue drain when output equal or less than 10 cc/hr/12 hr.  change dressing when drain pulled and as needed, honeycomb dressing.    -  Resume home meds as appropriate  -PT WBAT, balance and gait  - LSO with OOB not ordered/not mandatory.  Ortho/PT team can reevaluate possible benefit for additional external support daily, in post op period.   - probable home 3-5 days

## 2021-01-07 NOTE — DISCHARGE NOTE PROVIDER - NSDCFUADDINST_GEN_ALL_CORE_FT
Follow-up with Dr. Null within 7-10 days. Dressing change daily until dry, then leave dressing in place until office follow-up.  Showering at 48 hour is permitted.  Pain medications as indicated and titrated to patient's needs. Avoid NSAIDs for 6 weeks. Patient will begin aspirin 325mg once daily for 4 weeks post-operatively for clot prevention. Ambulate with assistance of walker. Contact office if the wound becomes red, opens or discharge increases.

## 2021-01-07 NOTE — DISCHARGE NOTE PROVIDER - NSDCCPCAREPLAN_GEN_ALL_CORE_FT
PRINCIPAL DISCHARGE DIAGNOSIS  Diagnosis: Spondylolisthesis, lumbar region  Assessment and Plan of Treatment:

## 2021-01-07 NOTE — PROGRESS NOTE ADULT - SUBJECTIVE AND OBJECTIVE BOX
LUPE CALZADASAMMICAMPOS    [id number]    50y      Female    CC: Low back pain s/p Lumbar radiculopathy POD#1  Doing well, pain is fairly well controlled , ambulated with PT, tolerating po, urinating without any issues. denies any light headedness/dizziness on ambulation        INTERVAL HPI/OVERNIGHT EVENTS: no acute events     REVIEW OF SYSTEMS:    CONSTITUTIONAL: No fever, weight loss, or fatigue  RESPIRATORY: No cough, wheezing, hemoptysis; No shortness of breath  CARDIOVASCULAR: No chest pain, palpitations  GASTROINTESTINAL: No abdominal or epigastric pain. No nausea, vomiting  NEUROLOGICAL: No headaches, memory loss, loss of strength.  MISCELLANEOUS:      Vital Signs Last 24 Hrs  T(C): 36.6 (07 Jan 2021 07:24), Max: 37.2 (06 Jan 2021 14:37)  T(F): 97.8 (07 Jan 2021 07:24), Max: 98.9 (06 Jan 2021 14:37)  HR: 73 (07 Jan 2021 07:24) (73 - 118)  BP: 97/59 (07 Jan 2021 07:24) (90/67 - 123/65)  BP(mean): --  RR: 18 (07 Jan 2021 07:24) (13 - 21)  SpO2: 96% (07 Jan 2021 07:24) (95% - 100%)    PHYSICAL EXAM:    GENERAL: NAD, well-groomed  HEENT: PERRL, +EOMI  NECK: soft, Supple, No JVD,   CHEST/LUNG: Clear to percussion bilaterally; No wheezing  HEART: S1S2+, Regular rate and rhythm; No murmurs, rubs, or gallops  ABDOMEN: Soft, Nontender, Nondistended; Bowel sounds present  EXTREMITIES:  2+ Peripheral Pulses, No clubbing, cyanosis, or edema  SKIN: No rashes or lesions  NEURO: AAOX3, no focal deficits, no motor r sensory loss  PSYCH: normal mood      01-06 @ 07:01  -  01-07 @ 07:00  --------------------------------------------------------  IN: 1100 mL / OUT: 2385 mL / NET: -1285 mL    01-07 @ 07:01  -  01-07 @ 13:42  --------------------------------------------------------  IN: 0 mL / OUT: 1100 mL / NET: -1100 mL        LABS:                        9.9    13.20 )-----------( 186      ( 07 Jan 2021 08:36 )             30.1     01-07    139  |  108<H>  |  11.0  ----------------------------<  130<H>  4.5   |  22.0  |  0.48<L>    Ca    8.7      07 Jan 2021 08:36              MEDICATIONS  (STANDING):  acetaminophen   Tablet .. 975 milliGRAM(s) Oral every 6 hours  aspirin enteric coated 325 milliGRAM(s) Oral daily  celecoxib 200 milliGRAM(s) Oral two times a day  dextrose 5% + sodium chloride 0.45%. 1000 milliLiter(s) (100 mL/Hr) IV Continuous <Continuous>  fentaNYL PCA (50 MICROgram(s)/mL) 30 milliLiter(s) PCA Continuous PCA Continuous  gabapentin 300 milliGRAM(s) Oral three times a day  methocarbamol 750 milliGRAM(s) Oral three times a day  QUEtiapine 25 milliGRAM(s) Oral at bedtime  senna 2 Tablet(s) Oral at bedtime    MEDICATIONS  (PRN):  aluminum hydroxide/magnesium hydroxide/simethicone Suspension 30 milliLiter(s) Oral every 12 hours PRN Indigestion  magnesium hydroxide Suspension 30 milliLiter(s) Oral every 12 hours PRN Constipation  naloxone Injectable 0.1 milliGRAM(s) IV Push every 3 minutes PRN For ANY of the following changes in patient status:  A. RR LESS THAN 10 breaths per minute, B. Oxygen saturation LESS THAN 90%, C. Sedation score of 6  ondansetron Injectable 4 milliGRAM(s) IV Push every 6 hours PRN Nausea  ondansetron Injectable 4 milliGRAM(s) IV Push every 6 hours PRN Nausea      RADIOLOGY & ADDITIONAL TESTS:

## 2021-01-07 NOTE — DISCHARGE NOTE PROVIDER - CARE PROVIDER_API CALL
Luis Felipe Null (DO)  Orthopaedic Surgery  [address] [place] [address], [address]  [place] [address]  Phone: [phone]  Fax: [phone]  Follow Up Time:

## 2021-01-08 LAB
ANION GAP SERPL CALC-SCNC: 6 MMOL/L — SIGNIFICANT CHANGE UP (ref 5–17)
BASOPHILS # BLD AUTO: 0.03 K/UL — SIGNIFICANT CHANGE UP (ref 0–0.2)
BASOPHILS NFR BLD AUTO: 0.3 % — SIGNIFICANT CHANGE UP (ref 0–2)
BUN SERPL-MCNC: 16 MG/DL — SIGNIFICANT CHANGE UP (ref 8–20)
CALCIUM SERPL-MCNC: 8.8 MG/DL — SIGNIFICANT CHANGE UP (ref 8.6–10.2)
CHLORIDE SERPL-SCNC: 111 MMOL/L — HIGH (ref 98–107)
CO2 SERPL-SCNC: 23 MMOL/L — SIGNIFICANT CHANGE UP (ref 22–29)
CREAT SERPL-MCNC: 0.54 MG/DL — SIGNIFICANT CHANGE UP (ref 0.5–1.3)
EOSINOPHIL # BLD AUTO: 0.07 K/UL — SIGNIFICANT CHANGE UP (ref 0–0.5)
EOSINOPHIL NFR BLD AUTO: 0.7 % — SIGNIFICANT CHANGE UP (ref 0–6)
GLUCOSE SERPL-MCNC: 109 MG/DL — HIGH (ref 70–99)
HCT VFR BLD CALC: 29.5 % — LOW (ref 34.5–45)
HGB BLD-MCNC: 9.3 G/DL — LOW (ref 11.5–15.5)
IMM GRANULOCYTES NFR BLD AUTO: 0.3 % — SIGNIFICANT CHANGE UP (ref 0–1.5)
LYMPHOCYTES # BLD AUTO: 3.38 K/UL — HIGH (ref 1–3.3)
LYMPHOCYTES # BLD AUTO: 32.6 % — SIGNIFICANT CHANGE UP (ref 13–44)
MCHC RBC-ENTMCNC: 30.1 PG — SIGNIFICANT CHANGE UP (ref 27–34)
MCHC RBC-ENTMCNC: 31.5 GM/DL — LOW (ref 32–36)
MCV RBC AUTO: 95.5 FL — SIGNIFICANT CHANGE UP (ref 80–100)
MONOCYTES # BLD AUTO: 0.56 K/UL — SIGNIFICANT CHANGE UP (ref 0–0.9)
MONOCYTES NFR BLD AUTO: 5.4 % — SIGNIFICANT CHANGE UP (ref 2–14)
NEUTROPHILS # BLD AUTO: 6.3 K/UL — SIGNIFICANT CHANGE UP (ref 1.8–7.4)
NEUTROPHILS NFR BLD AUTO: 60.7 % — SIGNIFICANT CHANGE UP (ref 43–77)
PLATELET # BLD AUTO: 153 K/UL — SIGNIFICANT CHANGE UP (ref 150–400)
POTASSIUM SERPL-MCNC: 4.4 MMOL/L — SIGNIFICANT CHANGE UP (ref 3.5–5.3)
POTASSIUM SERPL-SCNC: 4.4 MMOL/L — SIGNIFICANT CHANGE UP (ref 3.5–5.3)
RBC # BLD: 3.09 M/UL — LOW (ref 3.8–5.2)
RBC # FLD: 14.2 % — SIGNIFICANT CHANGE UP (ref 10.3–14.5)
SODIUM SERPL-SCNC: 140 MMOL/L — SIGNIFICANT CHANGE UP (ref 135–145)
WBC # BLD: 10.37 K/UL — SIGNIFICANT CHANGE UP (ref 3.8–10.5)
WBC # FLD AUTO: 10.37 K/UL — SIGNIFICANT CHANGE UP (ref 3.8–10.5)

## 2021-01-08 PROCEDURE — 99232 SBSQ HOSP IP/OBS MODERATE 35: CPT

## 2021-01-08 RX ORDER — OXYCODONE HYDROCHLORIDE 5 MG/1
10 TABLET ORAL
Refills: 0 | Status: DISCONTINUED | OUTPATIENT
Start: 2021-01-08 | End: 2021-01-10

## 2021-01-08 RX ORDER — POLYETHYLENE GLYCOL 3350 17 G/17G
17 POWDER, FOR SOLUTION ORAL DAILY
Refills: 0 | Status: DISCONTINUED | OUTPATIENT
Start: 2021-01-08 | End: 2021-01-10

## 2021-01-08 RX ORDER — HYDROMORPHONE HYDROCHLORIDE 2 MG/ML
4 INJECTION INTRAMUSCULAR; INTRAVENOUS; SUBCUTANEOUS
Refills: 0 | Status: DISCONTINUED | OUTPATIENT
Start: 2021-01-08 | End: 2021-01-10

## 2021-01-08 RX ORDER — OXYCODONE HYDROCHLORIDE 5 MG/1
5 TABLET ORAL
Refills: 0 | Status: DISCONTINUED | OUTPATIENT
Start: 2021-01-08 | End: 2021-01-10

## 2021-01-08 RX ADMIN — Medication 975 MILLIGRAM(S): at 13:19

## 2021-01-08 RX ADMIN — Medication 1 ENEMA: at 20:55

## 2021-01-08 RX ADMIN — Medication 975 MILLIGRAM(S): at 18:12

## 2021-01-08 RX ADMIN — MAGNESIUM HYDROXIDE 30 MILLILITER(S): 400 TABLET, CHEWABLE ORAL at 18:12

## 2021-01-08 RX ADMIN — Medication 975 MILLIGRAM(S): at 23:02

## 2021-01-08 RX ADMIN — GABAPENTIN 300 MILLIGRAM(S): 400 CAPSULE ORAL at 13:28

## 2021-01-08 RX ADMIN — SENNA PLUS 2 TABLET(S): 8.6 TABLET ORAL at 21:22

## 2021-01-08 RX ADMIN — CELECOXIB 200 MILLIGRAM(S): 200 CAPSULE ORAL at 05:31

## 2021-01-08 RX ADMIN — GABAPENTIN 300 MILLIGRAM(S): 400 CAPSULE ORAL at 21:22

## 2021-01-08 RX ADMIN — CELECOXIB 200 MILLIGRAM(S): 200 CAPSULE ORAL at 18:12

## 2021-01-08 RX ADMIN — METHOCARBAMOL 750 MILLIGRAM(S): 500 TABLET, FILM COATED ORAL at 05:31

## 2021-01-08 RX ADMIN — METHOCARBAMOL 750 MILLIGRAM(S): 500 TABLET, FILM COATED ORAL at 21:22

## 2021-01-08 RX ADMIN — METHOCARBAMOL 750 MILLIGRAM(S): 500 TABLET, FILM COATED ORAL at 13:28

## 2021-01-08 RX ADMIN — Medication 975 MILLIGRAM(S): at 05:31

## 2021-01-08 RX ADMIN — Medication 325 MILLIGRAM(S): at 13:19

## 2021-01-08 RX ADMIN — POLYETHYLENE GLYCOL 3350 17 GRAM(S): 17 POWDER, FOR SOLUTION ORAL at 13:19

## 2021-01-08 RX ADMIN — QUETIAPINE FUMARATE 25 MILLIGRAM(S): 200 TABLET, FILM COATED ORAL at 21:22

## 2021-01-08 RX ADMIN — GABAPENTIN 300 MILLIGRAM(S): 400 CAPSULE ORAL at 05:31

## 2021-01-08 RX ADMIN — SODIUM CHLORIDE 100 MILLILITER(S): 9 INJECTION, SOLUTION INTRAVENOUS at 04:07

## 2021-01-08 NOTE — PROGRESS NOTE ADULT - SUBJECTIVE AND OBJECTIVE BOX
LUPE EDINCAMPOS  [id number]    SUBJECTIVE: Patient is a 50y female s/p L4-L5, L5-S1 post fusion on 1/6/21, POD #2. Patient seen and examined at bedside. Pain is being well controlled with prescribed pain medications. Patient is participating in PT. Patient currently has radford in place after failure of voiding on her own yesterday. Patient denies CP, SOB, headache, dizziness, numbness, tingling, abdominal pain. No other complaints.       OBJECTIVE:     Vital Signs Last 24 Hrs  T(C): 36.3 (08 Jan 2021 07:25), Max: 36.7 (07 Jan 2021 19:40)  T(F): 97.4 (08 Jan 2021 07:25), Max: 98 (07 Jan 2021 19:40)  HR: 79 (08 Jan 2021 07:25) (71 - 84)  BP: 109/71 (08 Jan 2021 07:25) (77/49 - 129/84)  BP(mean): --  RR: 18 (08 Jan 2021 07:25) (18 - 18)  SpO2: 98% (08 Jan 2021 07:25) (94% - 98%)  I&O's Summary    07 Jan 2021 07:01  -  08 Jan 2021 07:00  --------------------------------------------------------  IN: 2200 mL / OUT: 4980 mL / NET: -2780 mL        Constitutional: Alert, responsive, in no acute distress.   Spine:          Dressing:  clean/dry/intact with no bleeding or discharge seen on dressing.                             IFTIKHAR drain in place, patent with 190cc output over 12 hours.               Sensation:            Upper extremity          grossly intact manually          Lower extremity           grossly intact manually                               Motor:         Lower extremity                         HF(l2)   KE(l3)    TA(l4)   EHL(l5)  GS(s1)                                                 R        5/5        5/5        5/5       5/5         5/5                                               L         5/5        5/5       5/5       5/5          5/5                                                        [x] warm well perfused; capillary refill <3 seconds. +2 DP pulse b/l.                A/P :  50y female s/p L4-L5, L5-S1 post fusion on 1/6/21, POD #2.  -    Pain control as clinically indicated. Avoid NSAIDS x 6 weeks.   -    DVT ppx: [ x]SCDs[ x] Pharmacolgic   mg once daily x 28 days post op  -    drain output 190 cc overnight, can discontinue drain when output equal or less than 10 cc/hr/12 hr.  change dressing when drain pulled and as needed, honeycomb dressing.    -    Resume home meds as appropriate  -    PT WBAT, balance and gait  -    LSO with OOB not ordered/not mandatory.  Ortho/PT team can reevaluate possible benefit for additional external support daily, in post op period.   -    Probable home 3-5 days

## 2021-01-08 NOTE — PROGRESS NOTE ADULT - ATTENDING COMMENTS
Attending statement:  I have personally seen this patient, and formed a face to face diagnostic evaluation on this patient on this date.  I have reviewed the PA, NP and or Medical/PA student and/or Resident documentation and agree with the history, physical exam and plan of care except if noted otherwise.
pt seen and examined at bedside   Lumbar radiculopathy s/p lumbar fusion POD#2. doing well, pain is controlled PCA was stopped, hypotensive this am, improved after PCA dced.  vitals stable. radford+ operative site drain+  agree with above   retained> 800ml after radford was dced - radford reinserted on 1/7 - attempt TOV after pt more ambulatory and moving her bowels, possibly on 1/10.  monitor BP   we will follow

## 2021-01-08 NOTE — PROGRESS NOTE ADULT - SUBJECTIVE AND OBJECTIVE BOX
CC: Lumbar laminectomy/fusion (07 Jan 2021 00:45)    HPI:  50 year old Nauruan speaking female with Covid illness in April 2020 recovered at home, insomnia on Seroquel with c/o chronic lower back pain that radiates down the left leg with numbness to the sole of the foot 2/2 spondylolisthesis in the lumbar region with failed conservative medical therapies limiting her daily activities presented for an elective Lumbar laminectomy and fusion POD #2.     INTERVAL HPI/OVERNIGHT EVENTS: Patient seen and examined sitting up in the chair with [employer] #[id number].  PCA removed this am secondary to hypotension.  Patient failed TOV and radford reinserted.  Patient denies any headache, dizziness, SOB, CP, abdominal pain, nausea, vomiting, dysuria.  Other ROS reviewed and are negative.    Vital Signs Last 24 Hrs  T(C): 36.3 (08 Jan 2021 07:25), Max: 36.7 (07 Jan 2021 19:40)  T(F): 97.4 (08 Jan 2021 07:25), Max: 98 (07 Jan 2021 19:40)  HR: 73 (08 Jan 2021 10:37) (71 - 84)  BP: 100/65 (08 Jan 2021 10:37) (77/49 - 129/84)  BP(mean): --  RR: 18 (08 Jan 2021 07:25) (18 - 18)  SpO2: 98% (08 Jan 2021 07:25) (94% - 98%)  I&O's Detail    07 Jan 2021 07:01  -  08 Jan 2021 07:00  --------------------------------------------------------  IN:    dextrose 5% + sodium chloride 0.45%: 1200 mL    Sodium Chloride 0.9% Bolus: 1000 mL  Total IN: 2200 mL    OUT:    Bulb (mL): 430 mL    Indwelling Catheter - Urethral (mL): 4550 mL  Total OUT: 4980 mL    Total NET: -2780 mL      08 Jan 2021 07:01  -  08 Jan 2021 11:03  --------------------------------------------------------  IN:  Total IN: 0 mL    OUT:    Bulb (mL): 60 mL    Indwelling Catheter - Urethral (mL): 800 mL  Total OUT: 860 mL    Total NET: -860 mL      PHYSICAL EXAM:  GENERAL: NAD, well-groomed, well-developed  HEAD:  Atraumatic, Normocephalic  NECK: Supple, No JVD, Normal thyroid  NERVOUS SYSTEM:  Alert & Oriented X3, Good concentration; Motor Strength 5/5 B/L upper and lower extremities  CHEST/LUNG: Clear to auscultation bilaterally; No rales, rhonchi, wheezing, or rubs  HEART: Regular rate and rhythm; No murmurs, rubs, or gallops  ABDOMEN: Soft, Nontender, Nondistended; Bowel sounds present; Radford to BSD  EXTREMITIES:  2+ Peripheral Pulses, No clubbing, cyanosis, or edema  SKIN: mid lower back with clean dressing                                9.3    10.37 )-----------( 153      ( 08 Jan 2021 07:05 )             29.5     08 Jan 2021 07:05    140    |  111    |  16.0   ----------------------------<  109    4.4     |  23.0   |  0.54     Ca    8.8        08 Jan 2021 07:05        MEDICATIONS  (STANDING):  acetaminophen   Tablet .. 975 milliGRAM(s) Oral every 6 hours  aspirin enteric coated 325 milliGRAM(s) Oral daily  celecoxib 200 milliGRAM(s) Oral two times a day  dextrose 5% + sodium chloride 0.45%. 1000 milliLiter(s) (100 mL/Hr) IV Continuous <Continuous>  gabapentin 300 milliGRAM(s) Oral three times a day  methocarbamol 750 milliGRAM(s) Oral three times a day  polyethylene glycol 3350 17 Gram(s) Oral daily  QUEtiapine 25 milliGRAM(s) Oral at bedtime  senna 2 Tablet(s) Oral at bedtime    MEDICATIONS  (PRN):  aluminum hydroxide/magnesium hydroxide/simethicone Suspension 30 milliLiter(s) Oral every 12 hours PRN Indigestion  HYDROmorphone   Tablet 4 milliGRAM(s) Oral every 3 hours PRN Severe Pain (7 - 10)  magnesium hydroxide Suspension 30 milliLiter(s) Oral every 12 hours PRN Constipation  naloxone Injectable 0.1 milliGRAM(s) IV Push every 3 minutes PRN For ANY of the following changes in patient status:  A. RR LESS THAN 10 breaths per minute, B. Oxygen saturation LESS THAN 90%, C. Sedation score of 6  ondansetron Injectable 4 milliGRAM(s) IV Push every 6 hours PRN Nausea  ondansetron Injectable 4 milliGRAM(s) IV Push every 6 hours PRN Nausea  oxyCODONE    IR 5 milliGRAM(s) Oral every 3 hours PRN Mild Pain (1 - 3)  oxyCODONE    IR 10 milliGRAM(s) Oral every 3 hours PRN Moderate Pain (4 - 6)

## 2021-01-09 LAB
ANION GAP SERPL CALC-SCNC: 8 MMOL/L — SIGNIFICANT CHANGE UP (ref 5–17)
BASOPHILS # BLD AUTO: 0.03 K/UL — SIGNIFICANT CHANGE UP (ref 0–0.2)
BASOPHILS NFR BLD AUTO: 0.3 % — SIGNIFICANT CHANGE UP (ref 0–2)
BUN SERPL-MCNC: 21 MG/DL — HIGH (ref 8–20)
CALCIUM SERPL-MCNC: 9 MG/DL — SIGNIFICANT CHANGE UP (ref 8.6–10.2)
CHLORIDE SERPL-SCNC: 107 MMOL/L — SIGNIFICANT CHANGE UP (ref 98–107)
CO2 SERPL-SCNC: 26 MMOL/L — SIGNIFICANT CHANGE UP (ref 22–29)
CREAT SERPL-MCNC: 0.55 MG/DL — SIGNIFICANT CHANGE UP (ref 0.5–1.3)
EOSINOPHIL # BLD AUTO: 0.14 K/UL — SIGNIFICANT CHANGE UP (ref 0–0.5)
EOSINOPHIL NFR BLD AUTO: 1.5 % — SIGNIFICANT CHANGE UP (ref 0–6)
GLUCOSE SERPL-MCNC: 95 MG/DL — SIGNIFICANT CHANGE UP (ref 70–99)
HCT VFR BLD CALC: 28.2 % — LOW (ref 34.5–45)
HGB BLD-MCNC: 9.3 G/DL — LOW (ref 11.5–15.5)
IMM GRANULOCYTES NFR BLD AUTO: 0.4 % — SIGNIFICANT CHANGE UP (ref 0–1.5)
LYMPHOCYTES # BLD AUTO: 3.16 K/UL — SIGNIFICANT CHANGE UP (ref 1–3.3)
LYMPHOCYTES # BLD AUTO: 33.5 % — SIGNIFICANT CHANGE UP (ref 13–44)
MCHC RBC-ENTMCNC: 30.6 PG — SIGNIFICANT CHANGE UP (ref 27–34)
MCHC RBC-ENTMCNC: 33 GM/DL — SIGNIFICANT CHANGE UP (ref 32–36)
MCV RBC AUTO: 92.8 FL — SIGNIFICANT CHANGE UP (ref 80–100)
MONOCYTES # BLD AUTO: 0.57 K/UL — SIGNIFICANT CHANGE UP (ref 0–0.9)
MONOCYTES NFR BLD AUTO: 6.1 % — SIGNIFICANT CHANGE UP (ref 2–14)
NEUTROPHILS # BLD AUTO: 5.48 K/UL — SIGNIFICANT CHANGE UP (ref 1.8–7.4)
NEUTROPHILS NFR BLD AUTO: 58.2 % — SIGNIFICANT CHANGE UP (ref 43–77)
PLATELET # BLD AUTO: 181 K/UL — SIGNIFICANT CHANGE UP (ref 150–400)
POTASSIUM SERPL-MCNC: 4 MMOL/L — SIGNIFICANT CHANGE UP (ref 3.5–5.3)
POTASSIUM SERPL-SCNC: 4 MMOL/L — SIGNIFICANT CHANGE UP (ref 3.5–5.3)
RBC # BLD: 3.04 M/UL — LOW (ref 3.8–5.2)
RBC # FLD: 14 % — SIGNIFICANT CHANGE UP (ref 10.3–14.5)
SODIUM SERPL-SCNC: 141 MMOL/L — SIGNIFICANT CHANGE UP (ref 135–145)
WBC # BLD: 9.42 K/UL — SIGNIFICANT CHANGE UP (ref 3.8–10.5)
WBC # FLD AUTO: 9.42 K/UL — SIGNIFICANT CHANGE UP (ref 3.8–10.5)

## 2021-01-09 PROCEDURE — 99232 SBSQ HOSP IP/OBS MODERATE 35: CPT

## 2021-01-09 RX ORDER — SODIUM CHLORIDE 9 MG/ML
500 INJECTION INTRAMUSCULAR; INTRAVENOUS; SUBCUTANEOUS ONCE
Refills: 0 | Status: COMPLETED | OUTPATIENT
Start: 2021-01-09 | End: 2021-01-09

## 2021-01-09 RX ADMIN — POLYETHYLENE GLYCOL 3350 17 GRAM(S): 17 POWDER, FOR SOLUTION ORAL at 13:37

## 2021-01-09 RX ADMIN — METHOCARBAMOL 750 MILLIGRAM(S): 500 TABLET, FILM COATED ORAL at 05:20

## 2021-01-09 RX ADMIN — Medication 975 MILLIGRAM(S): at 13:37

## 2021-01-09 RX ADMIN — GABAPENTIN 300 MILLIGRAM(S): 400 CAPSULE ORAL at 21:10

## 2021-01-09 RX ADMIN — Medication 325 MILLIGRAM(S): at 13:37

## 2021-01-09 RX ADMIN — CELECOXIB 200 MILLIGRAM(S): 200 CAPSULE ORAL at 05:20

## 2021-01-09 RX ADMIN — GABAPENTIN 300 MILLIGRAM(S): 400 CAPSULE ORAL at 13:37

## 2021-01-09 RX ADMIN — SODIUM CHLORIDE 1000 MILLILITER(S): 9 INJECTION INTRAMUSCULAR; INTRAVENOUS; SUBCUTANEOUS at 06:14

## 2021-01-09 RX ADMIN — METHOCARBAMOL 750 MILLIGRAM(S): 500 TABLET, FILM COATED ORAL at 13:37

## 2021-01-09 RX ADMIN — CELECOXIB 200 MILLIGRAM(S): 200 CAPSULE ORAL at 17:00

## 2021-01-09 RX ADMIN — Medication 975 MILLIGRAM(S): at 17:00

## 2021-01-09 RX ADMIN — QUETIAPINE FUMARATE 25 MILLIGRAM(S): 200 TABLET, FILM COATED ORAL at 21:10

## 2021-01-09 RX ADMIN — Medication 975 MILLIGRAM(S): at 05:19

## 2021-01-09 RX ADMIN — SENNA PLUS 2 TABLET(S): 8.6 TABLET ORAL at 21:10

## 2021-01-09 RX ADMIN — Medication 975 MILLIGRAM(S): at 23:35

## 2021-01-09 RX ADMIN — METHOCARBAMOL 750 MILLIGRAM(S): 500 TABLET, FILM COATED ORAL at 21:10

## 2021-01-09 RX ADMIN — GABAPENTIN 300 MILLIGRAM(S): 400 CAPSULE ORAL at 05:20

## 2021-01-09 NOTE — PROGRESS NOTE ADULT - SUBJECTIVE AND OBJECTIVE BOX
[id number]  LUPE JESUSNAUN    Patient seen and eval at bedside with hospital . Patient states she feels 80-90% better than before surgery. Patient denies numbness/tingling. Patient states she had alot of belly pain however it is all better now since she had a bowel movement last night. Patient denies CP, SOB, dizziness. Radford in place due to urinary retention.    T(C): 36.4 (01-09-21 @ 07:43), Max: 36.7 (01-08-21 @ 19:01)  HR: 70 (01-09-21 @ 07:44) (69 - 90)  BP: 92/59 (01-09-21 @ 07:44) (85/53 - 102/67)  RR: 18 (01-09-21 @ 07:44) (18 - 18)  SpO2: 98% (01-09-21 @ 07:44) (95% - 98%)    PE: NAD ,alert awake  Back: Dressing C/D/I, HV in place output 90 ccs over past 12 hrs  Motor exam:   Lower extremity                                 HF         KE          TA       EHL         GS                                                 R        5/5        5/5        5/5       5/5         5/5                                               L         5/5        5/5       5/5       5/5          5/5    SILT L2-S2 intact B/L, DP pulses 2+ B/L  Calf soft, NT B/L                          9.3    9.42  )-----------( 181      ( 09 Jan 2021 06:22 )             28.2     01-09    141  |  107  |  21.0<H>  ----------------------------<  95  4.0   |  26.0  |  0.55    Ca    9.0      09 Jan 2021 06:22      A/P: s/p lumbar fusion POD#3  Cont HV monitor output  Cont radford - will d/w Medicine regarding when to TOV  Pain control  DVT propx: ASA  PT -WBAT  Dispo: likely home tomorrow

## 2021-01-09 NOTE — PROGRESS NOTE ADULT - SUBJECTIVE AND OBJECTIVE BOX
Patient seen and examined . S/p lumbar lami and fusion   , POD # 3. Pain well controlled , no LLE radiculopathy ,   no n/v , failed TOV yesterday , Sandhu reinserted , had large BM yesterday .     CC : low back pain with LLE radiculopathy - post op back pain well controlled , no LLE radiculopathy ,   failed TOV             MEDICATIONS  (STANDING):  acetaminophen   Tablet .. 975 milliGRAM(s) Oral every 6 hours  aspirin enteric coated 325 milliGRAM(s) Oral daily  celecoxib 200 milliGRAM(s) Oral two times a day  dextrose 5% + sodium chloride 0.45%. 1000 milliLiter(s) (100 mL/Hr) IV Continuous <Continuous>  gabapentin 300 milliGRAM(s) Oral three times a day  methocarbamol 750 milliGRAM(s) Oral three times a day  polyethylene glycol 3350 17 Gram(s) Oral daily  QUEtiapine 25 milliGRAM(s) Oral at bedtime  senna 2 Tablet(s) Oral at bedtime    MEDICATIONS  (PRN):  aluminum hydroxide/magnesium hydroxide/simethicone Suspension 30 milliLiter(s) Oral every 12 hours PRN Indigestion  HYDROmorphone   Tablet 4 milliGRAM(s) Oral every 3 hours PRN Severe Pain (7 - 10)  magnesium hydroxide Suspension 30 milliLiter(s) Oral every 12 hours PRN Constipation  naloxone Injectable 0.1 milliGRAM(s) IV Push every 3 minutes PRN For ANY of the following changes in patient status:  A. RR LESS THAN 10 breaths per minute, B. Oxygen saturation LESS THAN 90%, C. Sedation score of 6  ondansetron Injectable 4 milliGRAM(s) IV Push every 6 hours PRN Nausea  ondansetron Injectable 4 milliGRAM(s) IV Push every 6 hours PRN Nausea  oxyCODONE    IR 5 milliGRAM(s) Oral every 3 hours PRN Mild Pain (1 - 3)  oxyCODONE    IR 10 milliGRAM(s) Oral every 3 hours PRN Moderate Pain (4 - 6)      LABS:                          9.3    9.42  )-----------( 181      ( 09 Jan 2021 06:22 )             28.2     01-09    141  |  107  |  21.0<H>  ----------------------------<  95  4.0   |  26.0  |  0.55    Ca    9.0      09 Jan 2021 06:22    RADIOLOGY & ADDITIONAL TESTS:          REVIEW OF SYSTEMS:    As above , all other systems are reviewed and are negative .    Vital Signs Last 24 Hrs  T(C): 36.4 (09 Jan 2021 07:43), Max: 36.7 (08 Jan 2021 19:01)  T(F): 97.5 (09 Jan 2021 07:43), Max: 98 (08 Jan 2021 19:01)  HR: 70 (09 Jan 2021 07:44) (69 - 90)  BP: 92/59 (09 Jan 2021 07:44) (85/53 - 102/67)  BP(mean): --  RR: 18 (09 Jan 2021 07:44) (18 - 18)  SpO2: 98% (09 Jan 2021 07:44) (95% - 98%)    PHYSICAL EXAM:    GENERAL: NAD, well-groomed, well-developed  HEAD:  Atraumatic, Normocephalic  EYES: EOMI, PERRLA, conjunctiva and sclera clear  NECK: Supple, No JVD, Normal thyroid  NERVOUS SYSTEM:  Alert & Oriented X3, no focal deficit  CHEST/LUNG: CTA b/l ,  no  rales, rhonchi, wheezing, or rubs  HEART: Regular rate and rhythm; No murmurs, rubs, or gallops  ABDOMEN: Soft, Nontender, Nondistended; Bowel sounds present  EXTREMITIES:  2+ Peripheral Pulses, No clubbing, cyanosis, or edema   LYMPH: No lymphadenopathy noted  SKIN: No rashes or lesions  Sandhu cath + , clean urine +   Low back IFTIKHAR + ,draining serosanguinous fluid

## 2021-01-10 ENCOUNTER — TRANSCRIPTION ENCOUNTER (OUTPATIENT)
Age: 51
End: 2021-01-10

## 2021-01-10 VITALS — HEART RATE: 74 BPM | TEMPERATURE: 98 F | DIASTOLIC BLOOD PRESSURE: 61 MMHG | SYSTOLIC BLOOD PRESSURE: 105 MMHG

## 2021-01-10 PROCEDURE — 76000 FLUOROSCOPY <1 HR PHYS/QHP: CPT

## 2021-01-10 PROCEDURE — 82962 GLUCOSE BLOOD TEST: CPT

## 2021-01-10 PROCEDURE — C1889: CPT

## 2021-01-10 PROCEDURE — 97163 PT EVAL HIGH COMPLEX 45 MIN: CPT

## 2021-01-10 PROCEDURE — 99232 SBSQ HOSP IP/OBS MODERATE 35: CPT

## 2021-01-10 PROCEDURE — 80048 BASIC METABOLIC PNL TOTAL CA: CPT

## 2021-01-10 PROCEDURE — 97110 THERAPEUTIC EXERCISES: CPT

## 2021-01-10 PROCEDURE — C1713: CPT

## 2021-01-10 PROCEDURE — 85025 COMPLETE CBC W/AUTO DIFF WBC: CPT

## 2021-01-10 PROCEDURE — 97116 GAIT TRAINING THERAPY: CPT

## 2021-01-10 PROCEDURE — 36415 COLL VENOUS BLD VENIPUNCTURE: CPT

## 2021-01-10 PROCEDURE — 97530 THERAPEUTIC ACTIVITIES: CPT

## 2021-01-10 RX ORDER — OMEPRAZOLE 10 MG/1
1 CAPSULE, DELAYED RELEASE ORAL
Qty: 28 | Refills: 0
Start: 2021-01-10 | End: 2021-02-06

## 2021-01-10 RX ORDER — OXYCODONE HYDROCHLORIDE 5 MG/1
1 TABLET ORAL
Qty: 15 | Refills: 0
Start: 2021-01-10

## 2021-01-10 RX ORDER — ACETAMINOPHEN 500 MG
3 TABLET ORAL
Qty: 0 | Refills: 0 | DISCHARGE
Start: 2021-01-10

## 2021-01-10 RX ORDER — ASPIRIN/CALCIUM CARB/MAGNESIUM 324 MG
1 TABLET ORAL
Qty: 28 | Refills: 0
Start: 2021-01-10 | End: 2021-02-06

## 2021-01-10 RX ORDER — SENNOSIDES/DOCUSATE SODIUM 8.6MG-50MG
2 TABLET ORAL
Qty: 10 | Refills: 0
Start: 2021-01-10

## 2021-01-10 RX ORDER — ASPIRIN/CALCIUM CARB/MAGNESIUM 324 MG
1 TABLET ORAL
Qty: 0 | Refills: 0 | DISCHARGE
Start: 2021-01-10

## 2021-01-10 RX ORDER — METHOCARBAMOL 500 MG/1
1 TABLET, FILM COATED ORAL
Qty: 10 | Refills: 0
Start: 2021-01-10

## 2021-01-10 RX ORDER — METHOCARBAMOL 500 MG/1
1 TABLET, FILM COATED ORAL
Qty: 0 | Refills: 0 | DISCHARGE
Start: 2021-01-10

## 2021-01-10 RX ADMIN — POLYETHYLENE GLYCOL 3350 17 GRAM(S): 17 POWDER, FOR SOLUTION ORAL at 13:04

## 2021-01-10 RX ADMIN — Medication 325 MILLIGRAM(S): at 13:04

## 2021-01-10 RX ADMIN — GABAPENTIN 300 MILLIGRAM(S): 400 CAPSULE ORAL at 05:42

## 2021-01-10 RX ADMIN — CELECOXIB 200 MILLIGRAM(S): 200 CAPSULE ORAL at 05:42

## 2021-01-10 RX ADMIN — Medication 975 MILLIGRAM(S): at 13:04

## 2021-01-10 RX ADMIN — Medication 975 MILLIGRAM(S): at 05:42

## 2021-01-10 RX ADMIN — METHOCARBAMOL 750 MILLIGRAM(S): 500 TABLET, FILM COATED ORAL at 05:42

## 2021-01-10 RX ADMIN — GABAPENTIN 300 MILLIGRAM(S): 400 CAPSULE ORAL at 13:04

## 2021-01-10 RX ADMIN — METHOCARBAMOL 750 MILLIGRAM(S): 500 TABLET, FILM COATED ORAL at 13:04

## 2021-01-10 NOTE — PROGRESS NOTE ADULT - SUBJECTIVE AND OBJECTIVE BOX
[id number]  LUPE JESUSNAUN    Patient seen and eval at bedside with hospital . Patient states when she turns on her right side she gets shooting pain and numbness in her right anterior thigh as she indicates,Patient denies CP, SOB, dizziness. Radford in place due to urinary retention.     T(C): 36.4 (01-09-21 @ 07:43), Max: 36.7 (01-08-21 @ 19:01)  HR: 70 (01-09-21 @ 07:44) (69 - 90)  BP: 92/59 (01-09-21 @ 07:44) (85/53 - 102/67)  RR: 18 (01-09-21 @ 07:44) (18 - 18)  SpO2: 98% (01-09-21 @ 07:44) (95% - 98%)    PE: NAD ,alert awake  Back: Dressing C/D/I, IFTIKHAR in place output 170ccs over past 24 hrs. D/w Dr. Null - IFTIKHAR drain removed without complication.   Motor exam:   Lower extremity                                 HF         KE          TA       EHL         GS                                                 R        5/5        5/5        5/5       5/5         5/5                                               L         5/5        5/5       5/5       5/5          5/5    SILT L2-S2 intact B/L however some decreased sensation over anterior right thigh, DP pulses 2+ B/L  Calf soft, NT B/L                          9.3    9.42  )-----------( 181      ( 09 Jan 2021 06:22 )             28.2     01-09    141  |  107  |  21.0<H>  ----------------------------<  95  4.0   |  26.0  |  0.55    Ca    9.0      09 Jan 2021 06:22    A/P: s/p lumbar fusion POD#4  ·	IFTIKHAR drain removed. Drain site cleansed with betadine swab and new clean dressing placed  ·	DC radford to TOV this a.m. as per medicine  ·	Pain control  ·	DVT propx: ASA  ·	Will monitor numbness right anterior thigh for now   ·	PT -WBAT  ·	Dispo: likely home today   [id number]  LUPE JESUSNAUN    Patient seen and eval at bedside with hospital . Patient states when she turns on her right side she gets shooting pain and numbness in her right anterior thigh as she indicates,Patient denies CP, SOB, dizziness. Radford in place due to urinary retention.     Vital Signs Last 24 Hrs  T(C): 36.4 (10 Oleksandr 2021 07:35), Max: 36.8 (10 Oleksandr 2021 05:04)  T(F): 97.6 (10 Oleksandr 2021 07:35), Max: 98.2 (10 Oleksandr 2021 05:04)  HR: 73 (10 Oleksandr 2021 07:35) (67 - 86)  BP: 104/65 (10 Oleksandr 2021 07:35) (96/60 - 104/65)  RR: 18 (10 Oleksandr 2021 07:35) (17 - 19)  SpO2: 96% (10 Oleksandr 2021 07:35) (96% - 98%)      PE: NAD ,alert awake  Back: Dressing C/D/I, IFTIKHAR in place output 170ccs over past 24 hrs. D/w Dr. Null - IFTIKHAR drain removed without complication.   Motor exam:   Lower extremity                                 HF         KE          TA       EHL         GS                                                 R        5/5        5/5        5/5       5/5         5/5                                               L         5/5        5/5       5/5       5/5          5/5    SILT L2-S2 intact B/L however some decreased sensation over anterior right thigh, DP pulses 2+ B/L  Calf soft, NT B/L                          9.3    9.42  )-----------( 181      ( 09 Jan 2021 06:22 )             28.2     01-09    141  |  107  |  21.0<H>  ----------------------------<  95  4.0   |  26.0  |  0.55    Ca    9.0      09 Jan 2021 06:22    A/P: s/p lumbar fusion POD#4  ·	IFTIKHAR drain removed. Drain site cleansed with betadine swab and new clean dressing placed  ·	DC radford to TOV this a.m. as per medicine  ·	Pain control  ·	DVT propx: ASA  ·	Will monitor numbness right anterior thigh for now   ·	PT -WBAT  ·	Dispo: likely home today

## 2021-01-10 NOTE — PROGRESS NOTE ADULT - ASSESSMENT
50 year old Sudanese speaking female with Covid illness in April 2020 recovered at home, insomnia with c/o chronic lower back pain the radiates down the left leg with numbness to the sole of the foot 2/2 spondylolisthesis in the lumbar region with failed conservative medical therapies limiting her daily activities presented for an elective Lumbar laminectomy and fusion on 1/6/21 .     # Spondylolisthesis at L4-L5 level /  lumbar radiculopathy LLE  - s/p Lumbar laminectomy & fusion   Pain control - PCA discontinued secondary to hypotension  Bowel regimen   Incentive spirometry  DVT px - per ortho   PT  PT/OT/pain mgmt      # Hypotension - Likely secondary to PCA, better  with IVF. Still on lower side , patient asymptomatic ,   will recheck BP - if still on lower side will consider ivf      # Postop  Urinary retention - failed TOV, would recommend repeat TOV in am , patient   states she was constipated , had large BM yesterday , increase ambulation ,  can consider Flomax if BP improved   and if she is still with urinary retention .     # acute blood loss anemia 2/2 postoperative state- HD stable, monitor  # Insomnia - on Seroquel 25mg daily HS   # h/o Covid pneumonia in 4/2020   # Pre diabetes - A1C noted 5.8 on presurgical labs , DASH diet , follow up with PMD as on outpatient for further   recommendations   Dispo plan - Home likely in am .   d/w patient / nurse / ortho PA .   
50  year old Lithuanian speaking female with Covid illness in April 2020 recovered at home, insomnia on seroquel with c/o chronic lower back pain the radiates down the left leg with numbness to the sole of the foot 2/2 spondylolisthesis in the lumbar region with failed conservative medical therapies limiting her daily activities presented for an elective Lumbar laminectomy and fusion. She is now s/p  Surgery.    # lumbar radiculopahty - s/p Lumbar laminectomy & fusion   Pain control   Bowel regimen   Incentive spirometry  DVT px - per ortho   PT  consider DC radford   # acute blood loss anemia 2/2 postoperative state- HD stable, monitor  # Insomnia - on Seroqul 25mg daily HS   # h/o Covid pneumonia in 4/2020       Will follow 
50 year old Macedonian speaking female with Covid illness in April 2020 recovered at home, insomnia on seroquel with c/o chronic lower back pain the radiates down the left leg with numbness to the sole of the foot 2/2 spondylolisthesis in the lumbar region with failed conservative medical therapies limiting her daily activities presented for an elective Lumbar laminectomy and fusion     # lumbar radiculopathy - s/p Lumbar laminectomy & fusion   Pain control - PCA discontinued secondary to hypotension  Bowel regimen   Incentive spirometry  DVT px - per ortho   PT  Failed TOV, radford reinserted.  Would recommend repeat TOV in few days, may consider Flomax if BP improved.    # Hypotension - Likely secondary to PCA, improved with IVF.    # Urinary retention - failed TOV, would recommend repeat TOV in few days, can consider Flomax if BP improved.  # acute blood loss anemia 2/2 postoperative state- HD stable, monitor  # Insomnia - on Seroquel 25mg daily HS   # h/o Covid pneumonia in 4/2020       Will follow 
50 year old Azeri speaking female with Covid illness in April 2020 recovered at home, insomnia with c/o chronic lower back pain the radiates down the left leg with numbness to the sole of the foot 2/2 spondylolisthesis in the lumbar region with failed conservative medical therapies limiting her daily activities presented for an elective Lumbar laminectomy and fusion on 1/6/21 .     # Spondylolisthesis at L4-L5 level /  lumbar radiculopathy LLE  - s/p Lumbar laminectomy & fusion   Pain control - PCA discontinued secondary to hypotension  Bowel regimen   Incentive spirometry  DVT px - per ortho   PT  PT/OT/pain mgmt  R thigh decreased sensation - as per ortho       # Hypotension - Likely secondary to PCA, better after ivf      # Postop  Urinary retention - failed TOV, had large BM after , Sandhu removed , TOV in progress     # acute blood loss anemia 2/2 postoperative state- HD stable, monitor  # Insomnia - on Seroquel 25mg daily HS   # h/o Covid pneumonia in 4/2020   # Pre diabetes - A1C noted 5.8 on presurgical labs , DASH diet , follow up with PMD as on outpatient for further   recommendations   Dispo plan - Home likely today   d/w patient / nurse / ortho PA .    Medically stable to d/c once cleared by physical therapy / ortho and if patient pass TOV .

## 2021-01-10 NOTE — PROGRESS NOTE ADULT - SUBJECTIVE AND OBJECTIVE BOX
Patient seen and examined . Pain well controlled , drain removed , c/o mild R thigh numbness, Sandhu removed this am , TOV in progress .     CC : chronic low back pain with radiculopathy , R thigh numbness + this am         MEDICATIONS  (STANDING):  acetaminophen   Tablet .. 975 milliGRAM(s) Oral every 6 hours  aspirin enteric coated 325 milliGRAM(s) Oral daily  celecoxib 200 milliGRAM(s) Oral two times a day  dextrose 5% + sodium chloride 0.45%. 1000 milliLiter(s) (100 mL/Hr) IV Continuous <Continuous>  gabapentin 300 milliGRAM(s) Oral three times a day  methocarbamol 750 milliGRAM(s) Oral three times a day  polyethylene glycol 3350 17 Gram(s) Oral daily  QUEtiapine 25 milliGRAM(s) Oral at bedtime  senna 2 Tablet(s) Oral at bedtime    MEDICATIONS  (PRN):  aluminum hydroxide/magnesium hydroxide/simethicone Suspension 30 milliLiter(s) Oral every 12 hours PRN Indigestion  HYDROmorphone   Tablet 4 milliGRAM(s) Oral every 3 hours PRN Severe Pain (7 - 10)  magnesium hydroxide Suspension 30 milliLiter(s) Oral every 12 hours PRN Constipation  naloxone Injectable 0.1 milliGRAM(s) IV Push every 3 minutes PRN For ANY of the following changes in patient status:  A. RR LESS THAN 10 breaths per minute, B. Oxygen saturation LESS THAN 90%, C. Sedation score of 6  ondansetron Injectable 4 milliGRAM(s) IV Push every 6 hours PRN Nausea  ondansetron Injectable 4 milliGRAM(s) IV Push every 6 hours PRN Nausea  oxyCODONE    IR 5 milliGRAM(s) Oral every 3 hours PRN Mild Pain (1 - 3)  oxyCODONE    IR 10 milliGRAM(s) Oral every 3 hours PRN Moderate Pain (4 - 6)      LABS:                          9.3    9.42  )-----------( 181      ( 09 Jan 2021 06:22 )             28.2     01-09    141  |  107  |  21.0<H>  ----------------------------<  95  4.0   |  26.0  |  0.55    Ca    9.0      09 Jan 2021 06:22        REVIEW OF SYSTEMS:    As above , all other systems are reviewed and are negative     Vital Signs Last 24 Hrs  T(C): 36.4 (10 Oleksandr 2021 07:35), Max: 36.8 (10 Oleksandr 2021 05:04)  T(F): 97.6 (10 Oleksandr 2021 07:35), Max: 98.2 (10 Oleksandr 2021 05:04)  HR: 73 (10 Oleksandr 2021 07:35) (67 - 86)  BP: 104/65 (10 Oleksandr 2021 07:35) (96/60 - 104/65)  BP(mean): --  RR: 18 (10 Oleksandr 2021 07:35) (17 - 19)  SpO2: 96% (10 Oleksandr 2021 07:35) (96% - 98%)    PHYSICAL EXAM:    GENERAL: NAD, well-groomed, well-developed  HEAD:  Atraumatic, Normocephalic  EYES: EOMI, PERRLA, conjunctiva and sclera clear  NECK: Supple, No JVD, Normal thyroid  NERVOUS SYSTEM:  Alert & Oriented X3, no focal deficit  CHEST/LUNG: CTA b/l ,  no  rales, rhonchi, wheezing, or rubs  HEART: Regular rate and rhythm; No murmurs, rubs, or gallops  ABDOMEN: Soft, Nontender, Nondistended; Bowel sounds present  EXTREMITIES:  2+ Peripheral Pulses, No clubbing, cyanosis, or edema  R thigh decreased sensation   LYMPH: No lymphadenopathy noted  SKIN: No rashes or lesions

## 2021-01-10 NOTE — PROGRESS NOTE ADULT - PROVIDER SPECIALTY LIST ADULT
Orthopedics
Hospitalist
Hospitalist
Orthopedics
Hospitalist
Hospitalist

## 2021-01-10 NOTE — DISCHARGE NOTE NURSING/CASE MANAGEMENT/SOCIAL WORK - PATIENT PORTAL LINK FT
You can access the FollowMyHealth Patient Portal offered by [facility] by registering at the following website: [email or web address]. By joining [employer]’s FollowMyHealth portal, you will also be able to view your health information using other applications (apps) compatible with our system.

## 2021-01-15 ENCOUNTER — APPOINTMENT (OUTPATIENT)
Dept: ORTHOPEDIC SURGERY | Facility: CLINIC | Age: 51
End: 2021-01-15
Payer: MEDICAID

## 2021-01-15 VITALS
HEIGHT: 62 IN | DIASTOLIC BLOOD PRESSURE: 71 MMHG | WEIGHT: 150 LBS | SYSTOLIC BLOOD PRESSURE: 116 MMHG | BODY MASS INDEX: 27.6 KG/M2 | HEART RATE: 98 BPM

## 2021-01-15 VITALS — TEMPERATURE: 96.1 F

## 2021-01-15 PROCEDURE — 72100 X-RAY EXAM L-S SPINE 2/3 VWS: CPT

## 2021-01-15 PROCEDURE — 99024 POSTOP FOLLOW-UP VISIT: CPT

## 2021-01-22 ENCOUNTER — NON-APPOINTMENT (OUTPATIENT)
Age: 51
End: 2021-01-22

## 2021-02-05 ENCOUNTER — APPOINTMENT (OUTPATIENT)
Dept: ORTHOPEDIC SURGERY | Facility: CLINIC | Age: 51
End: 2021-02-05
Payer: MEDICAID

## 2021-02-05 VITALS — TEMPERATURE: 97.5 F

## 2021-02-05 PROCEDURE — 99024 POSTOP FOLLOW-UP VISIT: CPT

## 2021-02-05 PROCEDURE — 72100 X-RAY EXAM L-S SPINE 2/3 VWS: CPT

## 2021-04-06 ENCOUNTER — APPOINTMENT (OUTPATIENT)
Dept: ORTHOPEDIC SURGERY | Facility: CLINIC | Age: 51
End: 2021-04-06
Payer: MEDICAID

## 2021-04-06 VITALS
DIASTOLIC BLOOD PRESSURE: 82 MMHG | BODY MASS INDEX: 27.6 KG/M2 | WEIGHT: 150 LBS | HEART RATE: 77 BPM | HEIGHT: 62 IN | SYSTOLIC BLOOD PRESSURE: 116 MMHG

## 2021-04-06 VITALS — TEMPERATURE: 96.6 F

## 2021-04-06 PROCEDURE — 99024 POSTOP FOLLOW-UP VISIT: CPT

## 2021-04-06 PROCEDURE — 72100 X-RAY EXAM L-S SPINE 2/3 VWS: CPT

## 2021-05-07 ENCOUNTER — APPOINTMENT (OUTPATIENT)
Dept: ORTHOPEDIC SURGERY | Facility: CLINIC | Age: 51
End: 2021-05-07
Payer: MEDICAID

## 2021-05-07 VITALS
HEIGHT: 62 IN | WEIGHT: 150 LBS | BODY MASS INDEX: 27.6 KG/M2 | SYSTOLIC BLOOD PRESSURE: 118 MMHG | DIASTOLIC BLOOD PRESSURE: 77 MMHG | HEART RATE: 68 BPM

## 2021-05-07 DIAGNOSIS — M48.062 SPINAL STENOSIS, LUMBAR REGION WITH NEUROGENIC CLAUDICATION: ICD-10-CM

## 2021-05-07 PROCEDURE — 99213 OFFICE O/P EST LOW 20 MIN: CPT | Mod: 25

## 2021-05-07 PROCEDURE — 99072 ADDL SUPL MATRL&STAF TM PHE: CPT

## 2021-05-07 PROCEDURE — 20552 NJX 1/MLT TRIGGER POINT 1/2: CPT

## 2021-05-07 PROCEDURE — 72100 X-RAY EXAM L-S SPINE 2/3 VWS: CPT

## 2021-05-18 ENCOUNTER — APPOINTMENT (OUTPATIENT)
Dept: ANTEPARTUM | Facility: CLINIC | Age: 51
End: 2021-05-18

## 2021-06-01 ENCOUNTER — ASOB RESULT (OUTPATIENT)
Age: 51
End: 2021-06-01

## 2021-06-01 ENCOUNTER — APPOINTMENT (OUTPATIENT)
Dept: ANTEPARTUM | Facility: CLINIC | Age: 51
End: 2021-06-01
Payer: MEDICAID

## 2021-06-01 PROCEDURE — 76856 US EXAM PELVIC COMPLETE: CPT | Mod: 59

## 2021-06-01 PROCEDURE — 99072 ADDL SUPL MATRL&STAF TM PHE: CPT

## 2021-06-01 PROCEDURE — 76830 TRANSVAGINAL US NON-OB: CPT

## 2021-07-09 ENCOUNTER — APPOINTMENT (OUTPATIENT)
Dept: ORTHOPEDIC SURGERY | Facility: CLINIC | Age: 51
End: 2021-07-09
Payer: MEDICAID

## 2021-07-09 VITALS
HEART RATE: 66 BPM | WEIGHT: 150 LBS | BODY MASS INDEX: 27.6 KG/M2 | DIASTOLIC BLOOD PRESSURE: 73 MMHG | SYSTOLIC BLOOD PRESSURE: 107 MMHG | HEIGHT: 62 IN

## 2021-07-09 DIAGNOSIS — M51.36 OTHER INTERVERTEBRAL DISC DEGENERATION, LUMBAR REGION: ICD-10-CM

## 2021-07-09 PROCEDURE — 99214 OFFICE O/P EST MOD 30 MIN: CPT

## 2021-07-09 PROCEDURE — 99072 ADDL SUPL MATRL&STAF TM PHE: CPT

## 2021-07-09 PROCEDURE — 72100 X-RAY EXAM L-S SPINE 2/3 VWS: CPT

## 2021-08-02 ENCOUNTER — APPOINTMENT (OUTPATIENT)
Dept: ORTHOPEDIC SURGERY | Facility: CLINIC | Age: 51
End: 2021-08-02
Payer: MEDICAID

## 2021-08-02 VITALS
WEIGHT: 153 LBS | BODY MASS INDEX: 28.89 KG/M2 | HEART RATE: 64 BPM | HEIGHT: 61 IN | SYSTOLIC BLOOD PRESSURE: 122 MMHG | DIASTOLIC BLOOD PRESSURE: 85 MMHG

## 2021-08-02 DIAGNOSIS — Z78.9 OTHER SPECIFIED HEALTH STATUS: ICD-10-CM

## 2021-08-02 PROCEDURE — 99072 ADDL SUPL MATRL&STAF TM PHE: CPT

## 2021-08-02 PROCEDURE — 99213 OFFICE O/P EST LOW 20 MIN: CPT

## 2021-08-02 RX ORDER — METHOCARBAMOL 750 MG/1
750 TABLET, FILM COATED ORAL
Qty: 30 | Refills: 1 | Status: DISCONTINUED | COMMUNITY
Start: 2021-04-06 | End: 2021-08-02

## 2021-08-02 RX ORDER — NAPROXEN 500 MG/1
500 TABLET ORAL
Qty: 60 | Refills: 1 | Status: DISCONTINUED | COMMUNITY
Start: 2020-01-23 | End: 2021-08-02

## 2021-08-02 RX ORDER — METHOCARBAMOL 750 MG/1
750 TABLET, FILM COATED ORAL
Qty: 90 | Refills: 0 | Status: DISCONTINUED | COMMUNITY
Start: 2021-01-22 | End: 2021-08-02

## 2021-08-02 RX ORDER — ONDANSETRON 4 MG/1
4 TABLET ORAL EVERY 8 HOURS
Qty: 15 | Refills: 0 | Status: DISCONTINUED | COMMUNITY
Start: 2021-01-15 | End: 2021-08-02

## 2021-08-02 RX ORDER — OXYCODONE 5 MG/1
5 TABLET ORAL EVERY 4 HOURS
Qty: 42 | Refills: 0 | Status: DISCONTINUED | COMMUNITY
Start: 2021-01-15 | End: 2021-08-02

## 2021-08-02 RX ORDER — METHOCARBAMOL 750 MG/1
750 TABLET, FILM COATED ORAL
Qty: 21 | Refills: 0 | Status: DISCONTINUED | COMMUNITY
Start: 2021-01-15 | End: 2021-08-02

## 2021-08-02 RX ORDER — OXYCODONE 5 MG/1
5 TABLET ORAL EVERY 4 HOURS
Qty: 42 | Refills: 0 | Status: DISCONTINUED | COMMUNITY
Start: 2021-01-22 | End: 2021-08-02

## 2021-08-13 ENCOUNTER — APPOINTMENT (OUTPATIENT)
Dept: MRI IMAGING | Facility: CLINIC | Age: 51
End: 2021-08-13
Payer: MEDICAID

## 2021-08-13 ENCOUNTER — OUTPATIENT (OUTPATIENT)
Dept: OUTPATIENT SERVICES | Facility: HOSPITAL | Age: 51
LOS: 1 days | End: 2021-08-13

## 2021-08-13 DIAGNOSIS — Z90.710 ACQUIRED ABSENCE OF BOTH CERVIX AND UTERUS: Chronic | ICD-10-CM

## 2021-08-13 DIAGNOSIS — M25.511 PAIN IN RIGHT SHOULDER: ICD-10-CM

## 2021-08-13 PROCEDURE — 73221 MRI JOINT UPR EXTREM W/O DYE: CPT | Mod: 26,RT

## 2021-08-30 ENCOUNTER — APPOINTMENT (OUTPATIENT)
Dept: ORTHOPEDIC SURGERY | Facility: CLINIC | Age: 51
End: 2021-08-30
Payer: MEDICAID

## 2021-08-30 VITALS
DIASTOLIC BLOOD PRESSURE: 76 MMHG | SYSTOLIC BLOOD PRESSURE: 112 MMHG | BODY MASS INDEX: 28.89 KG/M2 | WEIGHT: 153 LBS | HEART RATE: 99 BPM | HEIGHT: 61 IN

## 2021-08-30 DIAGNOSIS — M25.511 PAIN IN RIGHT SHOULDER: ICD-10-CM

## 2021-08-30 PROCEDURE — 20610 DRAIN/INJ JOINT/BURSA W/O US: CPT | Mod: RT

## 2021-08-30 PROCEDURE — 99213 OFFICE O/P EST LOW 20 MIN: CPT | Mod: 25

## 2021-08-30 PROCEDURE — 99072 ADDL SUPL MATRL&STAF TM PHE: CPT

## 2021-10-15 ENCOUNTER — APPOINTMENT (OUTPATIENT)
Dept: ORTHOPEDIC SURGERY | Facility: CLINIC | Age: 51
End: 2021-10-15
Payer: MEDICAID

## 2021-10-15 VITALS
BODY MASS INDEX: 28.89 KG/M2 | WEIGHT: 153 LBS | HEIGHT: 61 IN | HEART RATE: 78 BPM | SYSTOLIC BLOOD PRESSURE: 110 MMHG | DIASTOLIC BLOOD PRESSURE: 74 MMHG

## 2021-10-15 DIAGNOSIS — Z87.39 PERSONAL HISTORY OF OTHER DISEASES OF THE MUSCULOSKELETAL SYSTEM AND CONNECTIVE TISSUE: ICD-10-CM

## 2021-10-15 PROCEDURE — 99213 OFFICE O/P EST LOW 20 MIN: CPT

## 2021-10-15 PROCEDURE — 99072 ADDL SUPL MATRL&STAF TM PHE: CPT

## 2021-10-25 ENCOUNTER — APPOINTMENT (OUTPATIENT)
Dept: ORTHOPEDIC SURGERY | Facility: CLINIC | Age: 51
End: 2021-10-25

## 2021-11-09 ENCOUNTER — APPOINTMENT (OUTPATIENT)
Dept: ORTHOPEDIC SURGERY | Facility: CLINIC | Age: 51
End: 2021-11-09
Payer: MEDICAID

## 2021-11-09 VITALS
DIASTOLIC BLOOD PRESSURE: 78 MMHG | SYSTOLIC BLOOD PRESSURE: 107 MMHG | WEIGHT: 153 LBS | BODY MASS INDEX: 28.89 KG/M2 | HEIGHT: 61 IN | HEART RATE: 72 BPM

## 2021-11-09 DIAGNOSIS — M75.51 BURSITIS OF RIGHT SHOULDER: ICD-10-CM

## 2021-11-09 DIAGNOSIS — M75.52 BURSITIS OF RIGHT SHOULDER: ICD-10-CM

## 2021-11-09 PROCEDURE — 99213 OFFICE O/P EST LOW 20 MIN: CPT

## 2021-11-23 ENCOUNTER — APPOINTMENT (OUTPATIENT)
Dept: ORTHOPEDIC SURGERY | Facility: CLINIC | Age: 51
End: 2021-11-23
Payer: MEDICAID

## 2021-11-23 VITALS
WEIGHT: 153 LBS | HEIGHT: 61 IN | BODY MASS INDEX: 28.89 KG/M2 | DIASTOLIC BLOOD PRESSURE: 81 MMHG | SYSTOLIC BLOOD PRESSURE: 123 MMHG | HEART RATE: 64 BPM

## 2021-11-23 PROCEDURE — 99213 OFFICE O/P EST LOW 20 MIN: CPT

## 2021-11-23 PROCEDURE — 72040 X-RAY EXAM NECK SPINE 2-3 VW: CPT

## 2021-12-06 ENCOUNTER — APPOINTMENT (OUTPATIENT)
Dept: MRI IMAGING | Facility: CLINIC | Age: 51
End: 2021-12-06

## 2021-12-13 ENCOUNTER — APPOINTMENT (OUTPATIENT)
Dept: MRI IMAGING | Facility: CLINIC | Age: 51
End: 2021-12-13
Payer: MEDICAID

## 2021-12-13 ENCOUNTER — OUTPATIENT (OUTPATIENT)
Dept: OUTPATIENT SERVICES | Facility: HOSPITAL | Age: 51
LOS: 1 days | End: 2021-12-13

## 2021-12-13 DIAGNOSIS — M47.812 SPONDYLOSIS WITHOUT MYELOPATHY OR RADICULOPATHY, CERVICAL REGION: ICD-10-CM

## 2021-12-13 DIAGNOSIS — Z90.710 ACQUIRED ABSENCE OF BOTH CERVIX AND UTERUS: Chronic | ICD-10-CM

## 2021-12-13 PROCEDURE — 72141 MRI NECK SPINE W/O DYE: CPT | Mod: 26

## 2022-01-18 ENCOUNTER — APPOINTMENT (OUTPATIENT)
Dept: ORTHOPEDIC SURGERY | Facility: CLINIC | Age: 52
End: 2022-01-18
Payer: MEDICAID

## 2022-01-18 VITALS
HEART RATE: 77 BPM | DIASTOLIC BLOOD PRESSURE: 73 MMHG | BODY MASS INDEX: 28.89 KG/M2 | HEIGHT: 61 IN | SYSTOLIC BLOOD PRESSURE: 112 MMHG | WEIGHT: 153 LBS

## 2022-01-18 DIAGNOSIS — M43.16 SPONDYLOLISTHESIS, LUMBAR REGION: ICD-10-CM

## 2022-01-18 DIAGNOSIS — M47.812 SPONDYLOSIS W/OUT MYELOPATHY OR RADICULOPATHY, CERVICAL REGION: ICD-10-CM

## 2022-01-18 PROCEDURE — 99213 OFFICE O/P EST LOW 20 MIN: CPT

## 2022-01-18 PROCEDURE — 99072 ADDL SUPL MATRL&STAF TM PHE: CPT

## 2022-09-27 ENCOUNTER — EMERGENCY (EMERGENCY)
Facility: HOSPITAL | Age: 52
LOS: 1 days | Discharge: HOME | End: 2022-09-27
Attending: EMERGENCY MEDICINE
Payer: COMMERCIAL

## 2022-09-27 VITALS
HEART RATE: 82 BPM | DIASTOLIC BLOOD PRESSURE: 85 MMHG | HEIGHT: 62 IN | SYSTOLIC BLOOD PRESSURE: 127 MMHG | WEIGHT: 119.93 LBS | TEMPERATURE: 98 F | RESPIRATION RATE: 16 BRPM | OXYGEN SATURATION: 98 %

## 2022-09-27 DIAGNOSIS — Z90.710 ACQUIRED ABSENCE OF BOTH CERVIX AND UTERUS: Chronic | ICD-10-CM

## 2022-09-27 PROCEDURE — 99284 EMERGENCY DEPT VISIT MOD MDM: CPT | Mod: 25

## 2022-09-27 PROCEDURE — 72131 CT LUMBAR SPINE W/O DYE: CPT | Mod: 26,MB

## 2022-09-27 PROCEDURE — 72131 CT LUMBAR SPINE W/O DYE: CPT | Mod: MB

## 2022-09-27 PROCEDURE — 99284 EMERGENCY DEPT VISIT MOD MDM: CPT

## 2022-09-27 RX ORDER — METHOCARBAMOL 500 MG/1
1 TABLET, FILM COATED ORAL
Qty: 9 | Refills: 0
Start: 2022-09-27 | End: 2022-09-29

## 2022-09-27 RX ORDER — DIAZEPAM 5 MG
5 TABLET ORAL ONCE
Refills: 0 | Status: DISCONTINUED | OUTPATIENT
Start: 2022-09-27 | End: 2022-09-27

## 2022-09-27 RX ORDER — IBUPROFEN 200 MG
600 TABLET ORAL ONCE
Refills: 0 | Status: COMPLETED | OUTPATIENT
Start: 2022-09-27 | End: 2022-09-27

## 2022-09-27 RX ORDER — LIDOCAINE 4 G/100G
1 CREAM TOPICAL ONCE
Refills: 0 | Status: COMPLETED | OUTPATIENT
Start: 2022-09-27 | End: 2022-09-27

## 2022-09-27 RX ADMIN — Medication 600 MILLIGRAM(S): at 04:17

## 2022-09-27 RX ADMIN — Medication 5 MILLIGRAM(S): at 04:17

## 2022-09-27 RX ADMIN — LIDOCAINE 1 PATCH: 4 CREAM TOPICAL at 04:17

## 2022-09-27 NOTE — ED PROVIDER NOTE - PROGRESS NOTE DETAILS
reviewed ct results - pt to follow up with dr alan and follow up outpatient for MRI for incidental cyst

## 2022-09-27 NOTE — ED PROVIDER NOTE - PATIENT PORTAL LINK FT
You can access the FollowMyHealth Patient Portal offered by [facility] by registering at the following website: [email or web address]. By joining [facility]’s FollowMyHealth portal, you will also be able to view your health information using other applications (apps) compatible with our system.

## 2022-09-27 NOTE — ED PROVIDER NOTE - PHYSICAL EXAMINATION
Const: Awake, alert and oriented. In no acute distress. Well appearing.  HEENT: NC/AT. Moist mucous membranes.  Eyes: No scleral icterus. EOMI.  Neck:. Soft and supple. Full ROM without pain.  Cardiac: +S1/S2. No murmurs. Peripheral pulses 2+ and symmetric. No LE edema.  Resp: Speaking in full sentences. No evidence of respiratory distress. No wheezes, rales or rhonchi.  Abd: Soft, non-tender, non-distended. Normal bowel sounds in all 4 quadrants. No guarding or rebound.  Back: diffuse lumbar tenderness on palpation no bony step offs or deformities felt on palpation No CVAT.  Skin: No rashes, abrasions or lacerations.  Lymph: No cervical lymphadenopathy.  Neuro: Awake, alert & oriented x 3. CN II-XII intact finger to nose intact neurovascularly intact muscle strength fair gait without ataxia

## 2022-09-27 NOTE — ED PROVIDER NOTE - OBJECTIVE STATEMENT
pt is a 53 y/o female presenting to the ed for evaluation. pt reports having low back pain since two days ago. pt was in [facility] and was hit by an object in the back accidentally by one of the kids there. pt with hx of back surgery by dr alan - lumbar fusion in january 2021. pt denies cp sob headache neck pain visual changes abd pain nausea vomiting numbness or loss of sensation urinary or fecal incontinence

## 2022-09-27 NOTE — ED ADULT TRIAGE NOTE - CHIEF COMPLAINT QUOTE
Ambulatory to ED c/o lower back pain after being hit in the back with an object while shopping at [email or web address] on 9/25. patient now reports increased back pain, denies falling/LOC following accident. Patient able to ambulate w/o difficulty at this time.

## 2022-09-27 NOTE — ED PROVIDER NOTE - NSFOLLOWUPINSTRUCTIONS_ED_ALL_ED_FT
Acute Back Pain, Adult      Acute back pain is sudden and usually short-lived. It is often caused by an injury to the muscles and tissues in the back. The injury may result from:  •A muscle, tendon, or ligament getting overstretched or torn. Ligaments are tissues that connect bones to each other. Lifting something improperly can cause a back strain.      •Wear and tear (degeneration) of the spinal disks. Spinal disks are circular tissue that provide cushioning between the bones of the spine (vertebrae).      •Twisting motions, such as while playing sports or doing yard work.      •A hit to the back.      •Arthritis.      You may have a physical exam, lab tests, and imaging tests to find the cause of your pain. Acute back pain usually goes away with rest and home care.      Follow these instructions at home:    Managing pain, stiffness, and swelling     •Take over-the-counter and prescription medicines only as told by your health care provider. Treatment may include medicines for pain and inflammation that are taken by mouth or applied to the skin, or muscle relaxants.    •Your health care provider may recommend applying ice during the first 24–48 hours after your pain starts. To do this:  •Put ice in a plastic bag.      •Place a towel between your skin and the bag.      •Leave the ice on for 20 minutes, 2–3 times a day.      •Remove the ice if your skin turns bright red. This is very important. If you cannot feel pain, heat, or cold, you have a greater risk of damage to the area.      •If directed, apply heat to the affected area as often as told by your health care provider. Use the heat source that your health care provider recommends, such as a moist heat pack or a heating pad.  •Place a towel between your skin and the heat source.      •Leave the heat on for 20–30 minutes.      •Remove the heat if your skin turns bright red. This is especially important if you are unable to feel pain, heat, or cold. You have a greater risk of getting burned.          Activity   Comparisons of good and bad posture while driving, standing, sitting at a desk, and lifting heavy objects.   • Do not stay in bed. Staying in bed for more than 1–2 days can delay your recovery.    •Sit up and stand up straight. Avoid leaning forward when you sit or hunching over when you stand.  •If you work at a desk, sit close to it so you do not need to lean over. Keep your chin tucked in. Keep your neck drawn back, and keep your elbows bent at a 90-degree angle (right angle).      •Sit high and close to the steering wheel when you drive. Add lower back (lumbar) support to your car seat, if needed.        •Take short walks on even surfaces as soon as you are able. Try to increase the length of time you walk each day.      • Do not sit, drive, or  one place for more than 30 minutes at a time. Sitting or standing for long periods of time can put stress on your back.      • Do not drive or use heavy machinery while taking prescription pain medicine.    •Use proper lifting techniques. When you bend and lift, use positions that put less stress on your back:  •Bend your knees.      •Keep the load close to your body.      •Avoid twisting.        •Exercise regularly as told by your health care provider. Exercising helps your back heal faster and helps prevent back injuries by keeping muscles strong and flexible.      •Work with a physical therapist to make a safe exercise program, as recommended by your health care provider. Do any exercises as told by your physical therapist.      Lifestyle     •Maintain a healthy weight. Extra weight puts stress on your back and makes it difficult to have good posture.      •Avoid activities or situations that make you feel anxious or stressed. Stress and anxiety increase muscle tension and can make back pain worse. Learn ways to manage anxiety and stress, such as through exercise.      General instructions     •Sleep on a firm mattress in a comfortable position. Try lying on your side with your knees slightly bent. If you lie on your back, put a pillow under your knees.    •Keep your head and neck in a straight line with your spine (neutral position) when using electronic equipment like smartphones or pads. To do this:  •Raise your smartphone or pad to look at it instead of bending your head or neck to look down.      •Put the smartphone or pad at the level of your face while looking at the screen.      •Follow your treatment plan as told by your health care provider. This may include:  •Cognitive or behavioral therapy.      •Acupuncture or massage therapy.      •Meditation or yoga.          Contact a health care provider if:    •You have pain that is not relieved with rest or medicine.      •You have increasing pain going down into your legs or buttocks.      •Your pain does not improve after 2 weeks.      •You have pain at night.      •You lose weight without trying.      •You have a fever or chills.      •You develop nausea or vomiting.      •You develop abdominal pain.        Get help right away if:    •You develop new bowel or bladder control problems.      •You have unusual weakness or numbness in your arms or legs.      •You feel faint.      These symptoms may represent a serious problem that is an emergency. Do not wait to see if the symptoms will go away. Get medical help right away. Call your local emergency services (911 in the [place] Do not drive yourself to the hospital.       Summary    •Acute back pain is sudden and usually short-lived.      •Use proper lifting techniques. When you bend and lift, use positions that put less stress on your back.      •Take over-the-counter and prescription medicines only as told by your health care provider, and apply heat or ice as told.

## 2022-09-27 NOTE — ED PROVIDER NOTE - ATTENDING APP SHARED VISIT CONTRIBUTION OF CARE
Low back pain after a low mechanism fall without any signs, symptoms, or historical factors concerning for acute cord pathology. Question of loosening screw in hardware but no acute fractures. Multimodal analgesia, outpatient follow up with spine

## 2022-10-18 ENCOUNTER — APPOINTMENT (OUTPATIENT)
Dept: ORTHOPEDIC SURGERY | Facility: CLINIC | Age: 52
End: 2022-10-18

## 2022-10-18 VITALS
SYSTOLIC BLOOD PRESSURE: 105 MMHG | HEIGHT: 61 IN | DIASTOLIC BLOOD PRESSURE: 66 MMHG | HEART RATE: 81 BPM | BODY MASS INDEX: 28.89 KG/M2 | WEIGHT: 153 LBS

## 2022-10-18 DIAGNOSIS — M70.61 TROCHANTERIC BURSITIS, RIGHT HIP: ICD-10-CM

## 2022-10-18 PROCEDURE — 72100 X-RAY EXAM L-S SPINE 2/3 VWS: CPT

## 2022-10-18 PROCEDURE — 99214 OFFICE O/P EST MOD 30 MIN: CPT | Mod: 25

## 2022-10-18 PROCEDURE — 96372 THER/PROPH/DIAG INJ SC/IM: CPT | Mod: RT

## 2022-12-20 ENCOUNTER — APPOINTMENT (OUTPATIENT)
Dept: ORTHOPEDIC SURGERY | Facility: CLINIC | Age: 52
End: 2022-12-20

## 2022-12-20 VITALS — HEIGHT: 61 IN | BODY MASS INDEX: 28.89 KG/M2 | WEIGHT: 153 LBS

## 2022-12-20 DIAGNOSIS — M47.816 SPONDYLOSIS W/OUT MYELOPATHY OR RADICULOPATHY, LUMBAR REGION: ICD-10-CM

## 2022-12-20 DIAGNOSIS — Z98.1 ARTHRODESIS STATUS: ICD-10-CM

## 2022-12-20 DIAGNOSIS — M16.0 BILATERAL PRIMARY OSTEOARTHRITIS OF HIP: ICD-10-CM

## 2022-12-20 DIAGNOSIS — M67.951 UNSPECIFIED DISORDER OF SYNOVIUM AND TENDON, RIGHT THIGH: ICD-10-CM

## 2022-12-20 PROCEDURE — 99213 OFFICE O/P EST LOW 20 MIN: CPT

## 2022-12-20 PROCEDURE — 72100 X-RAY EXAM L-S SPINE 2/3 VWS: CPT

## 2023-01-04 ENCOUNTER — EMERGENCY (EMERGENCY)
Facility: HOSPITAL | Age: 53
LOS: 1 days | Discharge: HOME | End: 2023-01-04
Attending: STUDENT IN AN ORGANIZED HEALTH CARE EDUCATION/TRAINING PROGRAM
Payer: COMMERCIAL

## 2023-01-04 VITALS
DIASTOLIC BLOOD PRESSURE: 72 MMHG | HEART RATE: 69 BPM | RESPIRATION RATE: 18 BRPM | OXYGEN SATURATION: 100 % | HEIGHT: 62 IN | SYSTOLIC BLOOD PRESSURE: 105 MMHG | TEMPERATURE: 98 F | WEIGHT: 147.93 LBS

## 2023-01-04 VITALS
DIASTOLIC BLOOD PRESSURE: 64 MMHG | RESPIRATION RATE: 24 BRPM | OXYGEN SATURATION: 100 % | SYSTOLIC BLOOD PRESSURE: 120 MMHG | TEMPERATURE: 98 F | HEART RATE: 54 BPM

## 2023-01-04 DIAGNOSIS — Z90.710 ACQUIRED ABSENCE OF BOTH CERVIX AND UTERUS: Chronic | ICD-10-CM

## 2023-01-04 LAB
ALBUMIN SERPL ELPH-MCNC: 4.4 G/DL — SIGNIFICANT CHANGE UP (ref 3.3–5.2)
ALP SERPL-CCNC: 104 U/L — SIGNIFICANT CHANGE UP (ref 40–120)
ALT FLD-CCNC: 20 U/L — SIGNIFICANT CHANGE UP
ANION GAP SERPL CALC-SCNC: 13 MMOL/L — SIGNIFICANT CHANGE UP (ref 5–17)
AST SERPL-CCNC: 22 U/L — SIGNIFICANT CHANGE UP
BASE EXCESS BLDV CALC-SCNC: 0.8 MMOL/L — SIGNIFICANT CHANGE UP (ref -2–3)
BASOPHILS # BLD AUTO: 0.02 K/UL — SIGNIFICANT CHANGE UP (ref 0–0.2)
BASOPHILS NFR BLD AUTO: 0.2 % — SIGNIFICANT CHANGE UP (ref 0–2)
BILIRUB SERPL-MCNC: 0.3 MG/DL — LOW (ref 0.4–2)
BUN SERPL-MCNC: 29.8 MG/DL — HIGH (ref 8–20)
CA-I SERPL-SCNC: 1.13 MMOL/L — LOW (ref 1.15–1.33)
CALCIUM SERPL-MCNC: 9.1 MG/DL — SIGNIFICANT CHANGE UP (ref 8.4–10.5)
CHLORIDE BLDV-SCNC: 103 MMOL/L — SIGNIFICANT CHANGE UP (ref 96–108)
CHLORIDE SERPL-SCNC: 101 MMOL/L — SIGNIFICANT CHANGE UP (ref 96–108)
CO2 SERPL-SCNC: 23 MMOL/L — SIGNIFICANT CHANGE UP (ref 22–29)
CREAT SERPL-MCNC: 0.61 MG/DL — SIGNIFICANT CHANGE UP (ref 0.5–1.3)
EGFR: 106 ML/MIN/1.73M2 — SIGNIFICANT CHANGE UP
EOSINOPHIL # BLD AUTO: 0 K/UL — SIGNIFICANT CHANGE UP (ref 0–0.5)
EOSINOPHIL NFR BLD AUTO: 0 % — SIGNIFICANT CHANGE UP (ref 0–6)
GAS PNL BLDV: 134 MMOL/L — LOW (ref 136–145)
GAS PNL BLDV: SIGNIFICANT CHANGE UP
GAS PNL BLDV: SIGNIFICANT CHANGE UP
GLUCOSE BLDV-MCNC: 122 MG/DL — HIGH (ref 70–99)
GLUCOSE SERPL-MCNC: 125 MG/DL — HIGH (ref 70–99)
HCO3 BLDV-SCNC: 25 MMOL/L — SIGNIFICANT CHANGE UP (ref 22–29)
HCT VFR BLD CALC: 37.6 % — SIGNIFICANT CHANGE UP (ref 34.5–45)
HCT VFR BLDA CALC: 39 % — SIGNIFICANT CHANGE UP
HGB BLD CALC-MCNC: 13 G/DL — SIGNIFICANT CHANGE UP (ref 11.7–16.1)
HGB BLD-MCNC: 12.4 G/DL — SIGNIFICANT CHANGE UP (ref 11.5–15.5)
IMM GRANULOCYTES NFR BLD AUTO: 0.8 % — SIGNIFICANT CHANGE UP (ref 0–0.9)
LACTATE BLDV-MCNC: 1 MMOL/L — SIGNIFICANT CHANGE UP (ref 0.5–2)
LACTATE BLDV-MCNC: 3 MMOL/L — HIGH (ref 0.5–2)
LIDOCAIN IGE QN: 32 U/L — SIGNIFICANT CHANGE UP (ref 22–51)
LYMPHOCYTES # BLD AUTO: 19.1 % — SIGNIFICANT CHANGE UP (ref 13–44)
LYMPHOCYTES # BLD AUTO: 2.48 K/UL — SIGNIFICANT CHANGE UP (ref 1–3.3)
MCHC RBC-ENTMCNC: 29 PG — SIGNIFICANT CHANGE UP (ref 27–34)
MCHC RBC-ENTMCNC: 33 GM/DL — SIGNIFICANT CHANGE UP (ref 32–36)
MCV RBC AUTO: 88.1 FL — SIGNIFICANT CHANGE UP (ref 80–100)
MONOCYTES # BLD AUTO: 0.83 K/UL — SIGNIFICANT CHANGE UP (ref 0–0.9)
MONOCYTES NFR BLD AUTO: 6.4 % — SIGNIFICANT CHANGE UP (ref 2–14)
NEUTROPHILS # BLD AUTO: 9.53 K/UL — HIGH (ref 1.8–7.4)
NEUTROPHILS NFR BLD AUTO: 73.5 % — SIGNIFICANT CHANGE UP (ref 43–77)
PCO2 BLDV: 39 MMHG — SIGNIFICANT CHANGE UP (ref 39–42)
PH BLDV: 7.42 — SIGNIFICANT CHANGE UP (ref 7.32–7.43)
PLATELET # BLD AUTO: 284 K/UL — SIGNIFICANT CHANGE UP (ref 150–400)
PO2 BLDV: 173 MMHG — HIGH (ref 25–45)
POTASSIUM BLDV-SCNC: 4.1 MMOL/L — SIGNIFICANT CHANGE UP (ref 3.5–5.1)
POTASSIUM SERPL-MCNC: 4.1 MMOL/L — SIGNIFICANT CHANGE UP (ref 3.5–5.3)
POTASSIUM SERPL-SCNC: 4.1 MMOL/L — SIGNIFICANT CHANGE UP (ref 3.5–5.3)
PROT SERPL-MCNC: 7 G/DL — SIGNIFICANT CHANGE UP (ref 6.6–8.7)
RBC # BLD: 4.27 M/UL — SIGNIFICANT CHANGE UP (ref 3.8–5.2)
RBC # FLD: 14.1 % — SIGNIFICANT CHANGE UP (ref 10.3–14.5)
SAO2 % BLDV: 99.8 % — SIGNIFICANT CHANGE UP
SODIUM SERPL-SCNC: 137 MMOL/L — SIGNIFICANT CHANGE UP (ref 135–145)
TROPONIN T SERPL-MCNC: <0.01 NG/ML — SIGNIFICANT CHANGE UP (ref 0–0.06)
TROPONIN T SERPL-MCNC: <0.01 NG/ML — SIGNIFICANT CHANGE UP (ref 0–0.06)
WBC # BLD: 12.96 K/UL — HIGH (ref 3.8–10.5)
WBC # FLD AUTO: 12.96 K/UL — HIGH (ref 3.8–10.5)

## 2023-01-04 PROCEDURE — 99285 EMERGENCY DEPT VISIT HI MDM: CPT | Mod: 25

## 2023-01-04 PROCEDURE — 80053 COMPREHEN METABOLIC PANEL: CPT

## 2023-01-04 PROCEDURE — 85014 HEMATOCRIT: CPT

## 2023-01-04 PROCEDURE — 82330 ASSAY OF CALCIUM: CPT

## 2023-01-04 PROCEDURE — 36415 COLL VENOUS BLD VENIPUNCTURE: CPT

## 2023-01-04 PROCEDURE — 93005 ELECTROCARDIOGRAM TRACING: CPT

## 2023-01-04 PROCEDURE — 83605 ASSAY OF LACTIC ACID: CPT

## 2023-01-04 PROCEDURE — 84484 ASSAY OF TROPONIN QUANT: CPT

## 2023-01-04 PROCEDURE — 71045 X-RAY EXAM CHEST 1 VIEW: CPT

## 2023-01-04 PROCEDURE — 82435 ASSAY OF BLOOD CHLORIDE: CPT

## 2023-01-04 PROCEDURE — 84295 ASSAY OF SERUM SODIUM: CPT

## 2023-01-04 PROCEDURE — 85018 HEMOGLOBIN: CPT

## 2023-01-04 PROCEDURE — 96361 HYDRATE IV INFUSION ADD-ON: CPT

## 2023-01-04 PROCEDURE — 84132 ASSAY OF SERUM POTASSIUM: CPT

## 2023-01-04 PROCEDURE — 71045 X-RAY EXAM CHEST 1 VIEW: CPT | Mod: 26

## 2023-01-04 PROCEDURE — 96375 TX/PRO/DX INJ NEW DRUG ADDON: CPT

## 2023-01-04 PROCEDURE — 96374 THER/PROPH/DIAG INJ IV PUSH: CPT

## 2023-01-04 PROCEDURE — 82947 ASSAY GLUCOSE BLOOD QUANT: CPT

## 2023-01-04 PROCEDURE — 93010 ELECTROCARDIOGRAM REPORT: CPT

## 2023-01-04 PROCEDURE — 99284 EMERGENCY DEPT VISIT MOD MDM: CPT

## 2023-01-04 PROCEDURE — 85025 COMPLETE CBC W/AUTO DIFF WBC: CPT

## 2023-01-04 PROCEDURE — 82803 BLOOD GASES ANY COMBINATION: CPT

## 2023-01-04 PROCEDURE — 83690 ASSAY OF LIPASE: CPT

## 2023-01-04 RX ORDER — ACETAMINOPHEN 500 MG
1000 TABLET ORAL ONCE
Refills: 0 | Status: COMPLETED | OUTPATIENT
Start: 2023-01-04 | End: 2023-01-04

## 2023-01-04 RX ORDER — ONDANSETRON 8 MG/1
4 TABLET, FILM COATED ORAL ONCE
Refills: 0 | Status: COMPLETED | OUTPATIENT
Start: 2023-01-04 | End: 2023-01-04

## 2023-01-04 RX ORDER — SODIUM CHLORIDE 9 MG/ML
2000 INJECTION INTRAMUSCULAR; INTRAVENOUS; SUBCUTANEOUS ONCE
Refills: 0 | Status: COMPLETED | OUTPATIENT
Start: 2023-01-04 | End: 2023-01-04

## 2023-01-04 RX ORDER — FAMOTIDINE 10 MG/ML
1 INJECTION INTRAVENOUS
Qty: 14 | Refills: 0
Start: 2023-01-04 | End: 2023-01-10

## 2023-01-04 RX ORDER — ONDANSETRON 8 MG/1
1 TABLET, FILM COATED ORAL
Qty: 9 | Refills: 0
Start: 2023-01-04 | End: 2023-01-06

## 2023-01-04 RX ORDER — PANTOPRAZOLE SODIUM 20 MG/1
40 TABLET, DELAYED RELEASE ORAL ONCE
Refills: 0 | Status: COMPLETED | OUTPATIENT
Start: 2023-01-04 | End: 2023-01-04

## 2023-01-04 RX ADMIN — SODIUM CHLORIDE 2000 MILLILITER(S): 9 INJECTION INTRAMUSCULAR; INTRAVENOUS; SUBCUTANEOUS at 06:05

## 2023-01-04 RX ADMIN — ONDANSETRON 4 MILLIGRAM(S): 8 TABLET, FILM COATED ORAL at 05:05

## 2023-01-04 RX ADMIN — PANTOPRAZOLE SODIUM 40 MILLIGRAM(S): 20 TABLET, DELAYED RELEASE ORAL at 05:05

## 2023-01-04 RX ADMIN — Medication 400 MILLIGRAM(S): at 05:12

## 2023-01-04 NOTE — ED ADULT NURSE NOTE - OBJECTIVE STATEMENT
c/o epigastric pain x 3 hours. Pt stated she developed abd pain around 0100 associated with vomiting. Pt took maalox at home and an episode of vomiting afterwards. PMH pre DM, hysterectomy. Pt AOx4, speaking coherently, respirations even and unlabored on RA, skin warm and dry, NSR on CM, abd soft, ND, NT. CM and  in place, bed locked in the lowest position, side rails up.

## 2023-01-04 NOTE — ED PROVIDER NOTE - OBJECTIVE STATEMENT
51 yo female PMHx pre-DM, hysterectomy 2/2 fibroids presents to ED c/o abdominal pain x3 hours. Pain to epigastrium began at 1am Associated with vomiting which began at 2:30am. Self medicated with Maalox, but vomited afterwards. Additionally had two episodes left sided chest pain that lasted seconds which caused SOB. Never evaluated by cardiologist or had cardiac work up. No further complaints at this time.   Denies fmhx CAD, fevers, cough, SOB, diarrhea. 51 yo female PMHx pre-DM, hysterectomy 2/2 fibroids presents to ED c/o abdominal pain x3 hours. Pain to epigastrium began at 1am associated with vomiting which began at 2:30am. Self medicated with Maalox, but vomited afterwards. Additionally had two episodes left sided chest pain that lasted seconds which caused SOB. Never evaluated by cardiologist or had cardiac work up. No further complaints at this time.   Denies fmhx CAD, fevers, cough, diarrhea.

## 2023-01-04 NOTE — ED PROVIDER NOTE - PHYSICAL EXAMINATION
General: In NAD, non-toxic/well-appearing.  Skin: No rashes or lesions. Warm, dry, color normal for race.   Head: Normocephalic/atraumatic.   Eyes: Sclera anicteric, conjunctivae clear b/l. PERRLA, EOMI.   Neck: Supple, FROM.   Cardio: Rate and rhythm regular. No audible murmur.  Resp: Breath sounds vesicular, symmetrical and without rales, rhonchi or wheezing b/l.  Abd: Non-distended. Soft, non-tender, no masses palpated. No rebound, guarding. No CVA tenderness.  MSK: MAEx4. FROM.   Neuro: A&Ox3. No motor/sensory deficits.

## 2023-01-04 NOTE — ED PROVIDER NOTE - PROGRESS NOTE DETAILS
ALLISON Webb: Feels better. Results reviewed with patient and family. Agrees to delta Trop, will repeat lactate at time. Wandy Greco MD Attending Physician- received sign out labs improved, patient has no symptoms at this time,a bd soft, nontender, will d/c home with GI followup

## 2023-01-04 NOTE — ED ADULT TRIAGE NOTE - SOURCE OF INFORMATION
Date:  20  RE: Nicolás Hickman    : 1986  SOSA: Estimated Date of Delivery: 20  EGA: 18w4d          [address]  Current regimen:  Individualized meal plan: 2000 calories gestational diabetes diet  Decreases 1 CHO and replaces with 1 to 2 ounces of protein  Self monitoring blood glucose fasting and 2 hours after start of each meal    Insulin pen education provided during office visit  Plan:  Continue GDM diet and SMBG  Stay active if no restrictions from OBGYN, up to 30 minutes a day of walking  19 fetal growth ultrasound appeared normal  Pending fetal growth ultrasound 20 A1c 5 2%  Date due to report next:  Monday, 20 or sooner if needed       BRYSON Blanco, CDE  Diabetes Educator   [facility] Patient

## 2023-01-04 NOTE — ED ADULT NURSE REASSESSMENT NOTE - NS ED NURSE REASSESS COMMENT FT1
assumed care of pt from night rn; repeat labs sent, reepat EKG pending. pt pending re-eval by md. currently resting, offers no complaints. will continue to monitor ( on tele/o2 monitors)

## 2023-01-04 NOTE — ED PROVIDER NOTE - NSFOLLOWUPINSTRUCTIONS_ED_ALL_ED_FT
- Prescription sent to pharmacy.  - Increase fluids.  - [place] diet as tolerated. Avoid citrus based food/drink, spicy/greasy foods, milk, caffeine.   - Please call to schedule follow up appointment with your primary care physician within 24-48 hours.  - Please seek immediate medical attention for any new/worsening, signs/symptoms, or concerns.    Feel better!    - Receta enviada a farmacia.  - Incrementar los líquidos.  - Dieta blanda según la tolerancia. Evite los alimentos / bebidas a base de cítricos, los alimentos picantes / grasosos, la leche y la cafeína.  - Llame para programar demarco sy[place]ney de seguimiento con souza médico de atención primaria dentro de las 24 a 48 horas.  - Busque atención médica inmediata ante cualquier nuevo / empeoramiento, signo / síntoma o inquietud.    ¡Sentirse mejor!    Dolor abdominal dada    LO QUE NECESITA SABER:    ¿Qué necesito saber del dolor abdominal dada?Generalmente, el dolor abdominal dada comienza repentinamente y empeora rápidamente.    ¿Cuáles son las causas menores del dolor abdominal dada?  •Demarco reacción alérgica a alimentos o intoxicación por alimentos      •Estrés      •Reflujo gástrico      •Estreñimiento      •Dolor del periodo menstrual en las mujeres      ¿Cuáles son las causas graves del dolor abdominal dada?  •Inflamación o ruptura de souza apéndice      •Inflamación o infección en el abdomen o un órgano      •Demarco obstrucción en los intestinos      •Demarco úlcera o un desgarre en el esófago, el estómago o los intestinos      •Sangrado en el abdomen o un órgano      •Cálculos en el riñón o la vesícula biliar      •Enfermedades de las trompas de [place] o los ovarios      •Un embarazo ectópico      ¿Cómo se diagnostica la causa del dolor abdominal dada?Souza médico le preguntará acerca de mae signos y síntomas. Informe al médico cuándo comenzaron mae síntomas y sobre cualquier viaje o cirugía recientes. También infórmele qué hace que el dolor mejore o empeore, y qué tratamientos huggins probado. El médico lo examinará. Con base en mae síntomas y en lo que encuentre el médico en el examen, es posible que deba realizarse otros exámenes. Los ejemplos incluyen exámenes de bryce o de orina, ecografías, tomografías computarizadas o endoscopías.    ¿Cómo se trata el dolor abdominal dada?El tratamiento podría depender de la causa del dolor abdominal. Es posible que usted necesite alguno de los siguientes:   •Los medicamentosestos pueden administrarse para disminuir el dolor, tratar demarco infección y manejar mae síntomas, tales arabella el estreñimiento.      •La cirugíapuede necesitarse para tratar causas graves del dolor abdominal. Los ejemplos incluyen cirugías para tratar demarco apendicitis o demarco obstrucción en los intestinos.      ¿Cómo puedo controlar los síntomas?  •Aplique calorsobre el abdomen de 20 a 30 minutos cada 2 horas por los días que le indiquen. El calor ayuda a disminuir el dolor y los espasmos musculares.      •Realice cambios en los alimentos que consume según se le indique.No coma alimentos que causan dolor abdominal u otros síntomas. Ingiera comidas pequeñas, más a menudo. ?Coma más alimentos ricos en fibra si tiene estreñimiento. Los alimentos altos en fibra incluyen frutas, verduras, alimentos de grano integral y legumbres.      ?No coma alimentos que causan gas si tiene distensión. Por ejemplo, brócoli, col y coliflor. No cecilia gaseosas o bebidas carbonadas, ya que también pueden provocarle gases.      ?No consuma alimentos o bebidas que contienen sorbitol o fructosa si tiene diarrea y distensión. Algunos ejemplos son jugos de frutas, dulces, mermeladas y gomas de mascar sin azúcar.      ?No coma alimentos altos en grasas, arabella comidas fritas, hamburguesas con queso, salchichas y postres.      ?Limite o no tome cafeína. La cafeína puede empeorar los síntomas, arabella la acidez o las náuseas.      ?Cecilia suficientes líquidos para evitar la deshidratación causada por la diarrea o los vómitos. Pregunte a souza médico sobre la cantidad de líquido que necesita courtney todos los bandar y cuáles le recomienda.      •Controle souza estrés.El estrés puede causar dolor abdominal. Souza médico puede recomendarle técnicas de relajación y ejercicios de respiración profunda para ayudar a disminuir el estrés. Souza médico puede recomendarle que hable con alguien sobre souza estrés o ansiedad, arabella un consejero o un amigo de confianza. Duerma lo suficiente y realice ejercicio regularmente.      •Limite o no consuma bebidas alcohólicas.El alcohol puede empeorar el dolor abdominal. Pregunte a souza médico si usted puede courtney alcohol. Pregunte cuanto es la cantidad cordero para usted courtney.      •No fume.La nicotina y otros químicos en los cigarrillos pueden dañarle el esófago y el estómago. Pida información a souza médico si usted actualmente fuma y necesita ayuda para dejar de fumar. Los cigarrillos electrónicos o el tabaco sin humo igualmente contienen nicotina. Consulte con souza médico antes de utilizar estos productos.      ¿Cuándo nathan buscar atención inmediata?  •Usted no puede dejar de vomitar o vomita bryce.      •Usted tiene bryce en las evacuaciones intestinales o estas tienen un aspecto alquitranado.      •Usted tiene sangrado por souza recto.      •El tamaño del abdomen es más cristiano de lo normal y se siente tristan y más doloroso.      •Tiene dolor abdominal intenso.      •Usted umair de tener flatulencias y evacuaciones intestinales.      •Usted se siente mareado, débil o tiene sensación de desmayo.      ¿Cuándo nathan comunicarme con mi médico?  •Tiene fiebre.      •Tiene nuevos signos y síntomas.      •Mae síntomas no mejoran con el tratamiento.      •Usted tiene preguntas o inquietudes acerca de souza condición o cuidado.      ACUERDOS SOBRE SOUZA CUIDADO:    Usted tiene el derecho de ayudar a planear osuza cuidado. Aprenda todo lo que pueda sobre souza condición y arabella darle tratamiento. Discuta mae opciones de tratamiento con mae médicos para decidir el cuidado que usted desea recibir. Usted siempre tiene el derecho de rechazar el tratamiento.         Náuseas y vómitos agudos    LO QUE NECESITA SABER:    ¿Qué son las náuseas y vómitos agudos?Las náuseas y los vómitos agudos comienzan de forma repentina, empeoran rápidamente y sewell un período breve de tiempo.    ¿Cuáles son algunas causas frecuentes de las náuseas y los vómitos agudos?  •La intoxicación alimentaria      •Grandes cantidades de alcohol      •Ciertos medicamentos, demasiada cantidad de cualquier medicamento o la interrupción repentina de un medicamento regular      •[place] tempranas del embarazo      •Infección en el estómago, los intestinos u otros órganos      •Traumatismo en la vik      •Ansiedad o estrés      •Gastroparesia (demarco condición de joey que impide que el estómago se vacíe adecuadamente)      •Trastornos metabólicos, arabella uremia o insuficiencia suprarrenal      ¿Cuáles son las causas de las náuseas y vómitos agudos con dolor de estómago?  •Inflamación del apéndice, la vesícula biliar, el estómago, el páncreas, los riñones y otros órganos      •Cálculos biliares      •Demarco bacteria o parásito en el aparato digestivo      •[place] cardíaco      •Úlceras estomacales u obstrucción o contorsión intestinal      ¿Cuáles son las causas de las náuseas y vómitos agudos con otros signos y síntomas?Usted podría sudar y tener la piel pálida, tener problemas digestivos y salivar más que de costumbre. Es posible que estos signos y síntomas chloe el resultado de lo siguiente:  •Problemas con souza pulso, flujo hacia el músculo del corazón, presión arterial, la bryce o líquido del estómago      •Aumento de la presión o hemorragia en el cerebro      •Inflamación del tejido que recubre el cerebro      •Migrañas o convulsiones      •Trastornos del oído interno que causan problemas de equilibrio      ¿Cómo se diagnostica la causa de las náuseas y los vómitos agudos?Souza médico lo examinará y le preguntará sobre souza historial médico. Informe a souza médico acerca de cualquier otro signo y síntoma que tenga. Informe a souza médico cuándo tuvo náuseas y vómitos por última vez y cuánto duraron. Indique si sucedió antes, dilan o después de comer, y cuánto comió. Souza médico necesitará saber cuánto salió cuando vomitó, y si fue rápido y contundente. Dígale a souza médico si souza vómito olía a evacuación intestinal o contenía bryce o alimentos. Dígale a souza médico si el vómito era de color amarillo brillante. Es posible que usted necesite alguno de los siguientes:   •Los análisis de sangrese usan para detectar demarco infección o inflamación.      •Imágenes por radiografía, por resonancia magnética o por tomografía computarizadase pueden usar para detectar demarco lesión o un bloqueo.      ¿Cómo se tratan las náuseas y vómitos agudos?El primer objetivo del tratamiento para las náuseas y los vómitos es prevenir o tratar la deshidratación. El tratamiento también dependerá de la causa de las náuseas y vómitos. También se tratará cualquier condición médica que esté causando las náuseas y los vómitos. La meta del tratamiento también es detener o prevenir mae signos y síntomas. Usted podría necesitar allegra o más de los siguientes:  •Los medicamentosse puede administrar para calmarle el estómago y detener mae vómitos. Usted también puede necesitar medicamentos para ayudarlo a sentirse más relajado o para detener las náuseas y los vómitos causados por el mareo por movimiento. Pueden usarse estimulantes gastrointestinales para ayudar a vaciar souza estómago y los intestinos. [place] puede ayudar a disminuir las náuseas y los vómitos.      •Líquidos por vía intravenosa (IV)podrían administrarse para reemplazar los electrolitos y fluidos perdidos. [place] puede ser necesario si no puede beber líquidos.      •Sonda nasogástrica (NG):Se coloca demarco sonda NG dentro de souza nariz, y pasa por souza garganta hasta que llega al estómago. Se puede administrar medicamento o alimento a través de la sonda nasogástrica, si usted no puede courtney nada por la boca. Si en vez de esto, el médico necesita mantener souza estómago vacío, podría conectar el tubo a demarco máquina de succión.      ¿Qué puedo hacer para prevenir o tratar las náuseas y los vómitos agudos?  •No consuma alcohol.El alcohol podría causarle malestar o irritación estomacal. Demarco cantidad elevada de alcohol también puede causar náuseas y vómitos agudos.      •Controle el estrés.Los giselle de vik que son el resultado de tensión nerviosa pueden causar náuseas y vómitos. Busque la manera de relajarse y controlar el estrés. Descanse y duerma más.      •Manito más líquidos arabella se le indique.Los vómitos pueden llevar a la deshidratación. Es importante beber más líquidos para ayudar a reemplazar los fluidos corporales perdidos. Pregunte a souza médico sobre la cantidad de líquido que necesita courtney todos los bandar y cuáles le recomienda. Souza médico podría recomendarle que tome demarco solución de rehidratación oral (SRO). Las soluciones de rehidratación oral contienen la cantidad adecuada de agua, sales y azúcar que necesita para restituir los líquidos que perdió souza organismo. Pregunte qué tipo de solución de rehidratación oral debe usar, qué cantidad debe courtney y dónde puede obtenerla.      •Ingiera comidas más pequeñas, más a menudo.Coma pequeñas cantidades de comida cada 2 o 3 horas, incluso si no tiene hambre. Mae náuseas podrían disminuir si tiene comida en el estómago.      •Hable con souza médico antes de courtney medicamentos de venta roger.Estos medicamentos pueden causar problemas serios si los usa junto con ciertos medicamentos o si tiene determinadas condiciones médicas. Podrían tener problemas si usa demarco dosis demasiado helena o los usa dilan más tiempo de lo indicado. Siga las indicaciones de la etiqueta al pie de la letra.      ¿Cuándo nathan buscar atención inmediata?  •Usted nota bryce en souza vómito o en mae evacuaciones.      •Usted siente un dolor súbito e intenso en el pecho y la parte superior de souza abdomen después de tener vómitos jorge o tratar de vomitar.      •Usted tiene el roman y el pecho inflamados.      •Usted está mareado, tiene frío, sed y sequedad en los ojos y la boca.      •Usted está orinando muy poco o nada en absoluto.      •Usted tiene debilidad muscular, calambres en las piernas y dificultad para respirar.      •Souza corazón late más rápido que de costumbre.      •Usted continúa vomitando por más de 48 horas.      ¿Cuándo nathan comunicarme con mi médico?  •Usted tiene arcadas secas (vómitos sin que salga nada) frecuentes.      •Mae náusea y vómitos no mejoran ni desaparecen después de usar el medicamento.      •Usted tiene preguntas o inquietudes acerca de souza condición o cuidado.      ACUERDOS SOBRE SOUZA CUIDADO:    Usted tiene el derecho de ayudar a planear souza cuidado. Aprenda todo lo que pueda sobre souza condición y arabella darle tratamiento. Discuta mae opciones de tratamiento con mae médicos para decidir el cuidado que usted desea recibir. Usted siempre tiene el derecho de rechazar el tratamiento.

## 2023-01-04 NOTE — ED PROVIDER NOTE - PATIENT PORTAL LINK FT
You can access the FollowMyHealth Patient Portal offered by [facility] by registering at the following website: [email or web address]. By joining [employer]’s FollowMyHealth portal, you will also be able to view your health information using other applications (apps) compatible with our system.

## 2023-01-04 NOTE — ED PROVIDER NOTE - CLINICAL SUMMARY MEDICAL DECISION MAKING FREE TEXT BOX
53 yo female PMHx pre-DM, hysterectomy 2/2 fibroids presents to ED c/o epigastric abdominal pain x3 hours associated with vomiting. Likely GI related, abdomen benign. Low risk ACS, HEART score 2. Improved after medications. Check delta trop. If negative, stable for outpatient cardiology follow up.

## 2023-01-04 NOTE — ED ADULT TRIAGE NOTE - CHIEF COMPLAINT QUOTE
Ambulatory reporting epigastric abdominal pain and vomiting that started at 0120 this morning. Denies medical history, CP, SOB, sick contacts. Took Maalox but vomited many times after taking it.

## 2023-01-04 NOTE — ED PROVIDER NOTE - NSFOLLOWUPCLINICS_GEN_ALL_ED_FT
[facility]  Cardiology  [address], [address]  [place] [address]  Phone: [phone]  Fax:     [facility]astroenter[facility]  Gastroenterology  [address] [place] [address], [address]  [place] [address]  Phone: [phone]  Fax:   Follow Up Time: Routine

## 2023-01-25 NOTE — ED ADULT NURSE NOTE - CHIEF COMPLAINT QUOTE
Ambulatory reporting epigastric abdominal pain and vomiting that started at 0120 this morning. Denies medical history, CP, SOB, sick contacts. Took Maalox but vomited many times after taking it. Length To Time In Minutes Device Was In Place: 10

## 2023-02-27 ENCOUNTER — NON-APPOINTMENT (OUTPATIENT)
Age: 53
End: 2023-02-27

## 2023-02-27 ENCOUNTER — APPOINTMENT (OUTPATIENT)
Dept: CARDIOLOGY | Facility: CLINIC | Age: 53
End: 2023-02-27
Payer: MEDICAID

## 2023-02-27 ENCOUNTER — APPOINTMENT (OUTPATIENT)
Dept: GASTROENTEROLOGY | Facility: CLINIC | Age: 53
End: 2023-02-27
Payer: MEDICAID

## 2023-02-27 VITALS
HEIGHT: 61 IN | BODY MASS INDEX: 27 KG/M2 | DIASTOLIC BLOOD PRESSURE: 78 MMHG | WEIGHT: 143 LBS | HEART RATE: 100 BPM | SYSTOLIC BLOOD PRESSURE: 103 MMHG | TEMPERATURE: 97.3 F | OXYGEN SATURATION: 97 %

## 2023-02-27 VITALS
BODY MASS INDEX: 26.13 KG/M2 | HEART RATE: 70 BPM | TEMPERATURE: 97.8 F | HEIGHT: 62 IN | SYSTOLIC BLOOD PRESSURE: 123 MMHG | DIASTOLIC BLOOD PRESSURE: 75 MMHG | WEIGHT: 142 LBS | OXYGEN SATURATION: 95 %

## 2023-02-27 DIAGNOSIS — R10.13 EPIGASTRIC PAIN: ICD-10-CM

## 2023-02-27 DIAGNOSIS — Z82.49 FAMILY HISTORY OF ISCHEMIC HEART DISEASE AND OTHER DISEASES OF THE CIRCULATORY SYSTEM: ICD-10-CM

## 2023-02-27 DIAGNOSIS — Z83.3 FAMILY HISTORY OF DIABETES MELLITUS: ICD-10-CM

## 2023-02-27 DIAGNOSIS — Z78.9 OTHER SPECIFIED HEALTH STATUS: ICD-10-CM

## 2023-02-27 DIAGNOSIS — G47.00 INSOMNIA, UNSPECIFIED: ICD-10-CM

## 2023-02-27 PROCEDURE — 99203 OFFICE O/P NEW LOW 30 MIN: CPT | Mod: 25

## 2023-02-27 PROCEDURE — 93000 ELECTROCARDIOGRAM COMPLETE: CPT

## 2023-02-27 PROCEDURE — 99204 OFFICE O/P NEW MOD 45 MIN: CPT

## 2023-02-27 RX ORDER — METHYLPREDNISOLONE 4 MG/1
4 TABLET ORAL
Qty: 1 | Refills: 0 | Status: COMPLETED | COMMUNITY
Start: 2019-11-19 | End: 2023-02-27

## 2023-02-27 NOTE — ASSESSMENT
[FreeTextEntry1] : Impression:\par 1. Epigastric pain that resolved with PPI - ?due to acid reflux; given nausea/vomiting, would rule out biliary colic also\par \par Plan:\par -As patient is completely resolved now and patient has no alarm symptoms and has had prior negative EGD, recommended monitoring for now\par -Advised lifestyle modifications for reflux. Asked patient to avoid eating 3 hours before going to bed or laying down, and to elevate the head of his bed.\par -Trial of Pepcid PRN instead of PPI PRN if the pain recurs\par -Check abdominal sonogram\par -Advised repeat EGD if pain recurs, but patient lives in [place] and this office is too far for her; advised that we can get the sonogram, but if needed provided number for our [facility] GI colleagues where she can more easily access care\par -She is up to date with colon cancer screening, repeat in 2028 as she's average risk and last colonoscopy in 2018 was reportedly  unremarkable

## 2023-02-27 NOTE — REVIEW OF SYSTEMS
[As Noted in HPI] : as noted in HPI [Negative] : Heme/Lymph [Fever] : no fever [Chills] : no chills [FreeTextEntry2] : Intentional

## 2023-02-27 NOTE — PHYSICAL EXAM
[Alert] : alert [Normal Voice/Communication] : normal voice/communication [Sclera] : the sclera and conjunctiva were normal [Hearing Threshold Finger Rub Not Jones] : hearing was normal [Normal Appearance] : the appearance of the neck was normal [No Respiratory Distress] : no respiratory distress [No Acc Muscle Use] : no accessory muscle use [Respiration, Rhythm And Depth] : normal respiratory rhythm and effort [Heart Rate And Rhythm] : heart rate was normal and rhythm regular [Normal S1, S2] : normal S1 and S2 [Murmurs] : no murmurs [Bowel Sounds] : normal bowel sounds [Abdomen Tenderness] : non-tender [No Masses] : no abdominal mass palpated [Abdomen Soft] : soft [No Spinal Tenderness] : no spinal tenderness [Abnormal Walk] : normal gait [Normal Color / Pigmentation] : normal skin color and pigmentation [No Focal Deficits] : no focal deficits [Oriented To Time, Place, And Person] : oriented to person, place, and time

## 2023-02-27 NOTE — HISTORY OF PRESENT ILLNESS
[FreeTextEntry1] : This is a 52 year old female with pre-diabetes, who presents for evaluation of abdominal pain, nausea/vomiting.\par \par The patient was seen in the ER in January 2023 for abdominal pain, nausea/vomiting. She reports epigastric pain x 4 the first time was many years ago, and the last was in January. It does not radiate. She describes it as a burning, and is associated with vomiting which consists of liquid vomitus.  It has since completely resolved. When she gets the pain, it will last for days until she takes omeprazole. Prior to this episode, the last time she had it was years ago. It occurs after eating. No dysphagia. No abnormal weight loss. No abnormal weight loss. Her stools have been unchanged, no diarrhea/melena/hematochezia. She reports having had both EGD and colonoscopy 5 years ago, which she reports were normal. She has since stopped the omeprazole after taking it for 1 month. She takes Meloxicam twice a week along with gabapentin, and rare naproxen. She works at night, and denies eating 2-3 hours before going to sleep.\par \par 1/4/23: WBC 12, Hgb 12.4, Hct 37.6, \par Na 137, K 4.1, Cl 101, bicarb 23, BUN 29, Cr 0.61, glucose 125, Ca 9.1, T protein 7, albumin 4.4, T bili 0.3, , AST 22, ALT 20\par Lipase 32

## 2023-02-27 NOTE — ASSESSMENT
[FreeTextEntry1] : EKG 2/27/2023- Sinus  Bradycardia \par No acute STT changes \par Normal ECG\par \par Assessment:\par 1.  Chest pain\par \par Recommendations:\par 1.  Echocardiogram and regular stress test\par 2.  Follow-up in a month

## 2023-02-27 NOTE — REASON FOR VISIT
[Initial Evaluation] : an initial evaluation [Family Member] : family member [Source: ______] : History obtained from [unfilled] [FreeTextEntry1] : Abdominal pain

## 2023-02-27 NOTE — HISTORY OF PRESENT ILLNESS
[FreeTextEntry1] : : Tamiko Leary MA\par \par HPI: Patient is a 52-year-old , Indonesian-speaking female who presents for cardiac evaluation because of chest pain.  Patient went to [facility] on January 4, 2023 plaints of abdominal pain and chest pain.  Hospital records reviewed.  Patient had negative troponin negative chest x-ray is normal ECG. \par Patient is still having substernal chest pain nonexertional, lasting for less than a minute.  Patient denies any associated nausea vomiting diaphoresis.  Patient denies orthopnea, PND or leg edema.\par \par \par PMH: Prediabetes.  No other problems\par \par Social History: Non-smoker.  Denies any alcohol or substance abuse\par \par Family history: Noncontributory\par \par

## 2023-04-03 ENCOUNTER — APPOINTMENT (OUTPATIENT)
Dept: ANTEPARTUM | Facility: CLINIC | Age: 53
End: 2023-04-03

## 2023-04-05 ENCOUNTER — APPOINTMENT (OUTPATIENT)
Dept: CARDIOLOGY | Facility: CLINIC | Age: 53
End: 2023-04-05
Payer: MEDICAID

## 2023-04-05 PROCEDURE — 93015 CV STRESS TEST SUPVJ I&R: CPT

## 2023-04-21 ENCOUNTER — APPOINTMENT (OUTPATIENT)
Dept: CARDIOLOGY | Facility: CLINIC | Age: 53
End: 2023-04-21
Payer: MEDICAID

## 2023-04-21 PROBLEM — D25.9 LEIOMYOMA OF UTERUS, UNSPECIFIED: Chronic | Status: ACTIVE | Noted: 2020-12-18

## 2023-04-21 PROCEDURE — 93306 TTE W/DOPPLER COMPLETE: CPT

## 2023-04-24 ENCOUNTER — APPOINTMENT (OUTPATIENT)
Dept: ANTEPARTUM | Facility: CLINIC | Age: 53
End: 2023-04-24

## 2023-05-01 ENCOUNTER — APPOINTMENT (OUTPATIENT)
Dept: ANTEPARTUM | Facility: CLINIC | Age: 53
End: 2023-05-01
Payer: MEDICAID

## 2023-05-01 ENCOUNTER — ASOB RESULT (OUTPATIENT)
Age: 53
End: 2023-05-01

## 2023-05-01 PROCEDURE — 76856 US EXAM PELVIC COMPLETE: CPT | Mod: 59

## 2023-05-01 PROCEDURE — 76830 TRANSVAGINAL US NON-OB: CPT

## 2023-05-26 ENCOUNTER — APPOINTMENT (OUTPATIENT)
Dept: CARDIOLOGY | Facility: CLINIC | Age: 53
End: 2023-05-26
Payer: MEDICAID

## 2023-05-26 VITALS
HEART RATE: 75 BPM | WEIGHT: 141 LBS | HEIGHT: 61 IN | BODY MASS INDEX: 26.62 KG/M2 | SYSTOLIC BLOOD PRESSURE: 133 MMHG | OXYGEN SATURATION: 95 % | DIASTOLIC BLOOD PRESSURE: 80 MMHG

## 2023-05-26 DIAGNOSIS — R07.9 CHEST PAIN, UNSPECIFIED: ICD-10-CM

## 2023-05-26 PROCEDURE — 99213 OFFICE O/P EST LOW 20 MIN: CPT

## 2023-05-26 NOTE — HISTORY OF PRESENT ILLNESS
[FreeTextEntry1] : : Jimmy \par \par HPI: Patient is a 52-year-old , American-speaking female who presents for cardiac evaluation because of chest pain.  Patient went to [facility] on January 4, 2023 plaints of abdominal pain and chest pain.  Hospital records reviewed.  Patient had negative troponin negative chest x-ray is normal ECG. \par Patient is still having substernal chest pain nonexertional, lasting for less than a minute.  Patient denies any associated nausea vomiting diaphoresis.  Patient denies orthopnea, PND or leg edema.\par \par Today patient presents for a follow-up visit, she was seen by me in February for chest pain as noted above.  Since last visit patient had an echocardiogram and a stress test.  Patient reports she is still having some chest discomfort.  No other complaints\par \par PMH: Prediabetes.  No other problems\par \par Social History: Non-smoker.  Denies any alcohol or substance abuse\par \par Family history: Noncontributory\par \par

## 2023-05-26 NOTE — ASSESSMENT
[FreeTextEntry1] : EKG 2/27/2023- Sinus  Bradycardia \par No acute STT changes \par Normal ECG\par \par Regular stress test April 2023: Negative stress test\par Echocardiogram April 21, 2023: LVEF 55 to 60%.  Normal echocardiogram\par \par Assessment:\par 1.  Chest pain-patient's chest pain is unlikely from coronary etiology.\par \par Recommendations:\par Patient has normal echocardiogram and a normal stress test.\par Patient recommended to follow-up with her PCP for further management of possible noncardiac chest pain\par Follow-up as needed

## 2023-06-16 ENCOUNTER — OUTPATIENT (OUTPATIENT)
Dept: OUTPATIENT SERVICES | Facility: HOSPITAL | Age: 53
LOS: 1 days | End: 2023-06-16
Payer: COMMERCIAL

## 2023-06-16 VITALS
DIASTOLIC BLOOD PRESSURE: 70 MMHG | HEART RATE: 78 BPM | OXYGEN SATURATION: 96 % | WEIGHT: 140.21 LBS | HEIGHT: 72 IN | TEMPERATURE: 97 F | RESPIRATION RATE: 16 BRPM | SYSTOLIC BLOOD PRESSURE: 120 MMHG

## 2023-06-16 DIAGNOSIS — Z98.890 OTHER SPECIFIED POSTPROCEDURAL STATES: Chronic | ICD-10-CM

## 2023-06-16 DIAGNOSIS — E11.9 TYPE 2 DIABETES MELLITUS WITHOUT COMPLICATIONS: ICD-10-CM

## 2023-06-16 DIAGNOSIS — N83.209 UNSPECIFIED OVARIAN CYST, UNSPECIFIED SIDE: ICD-10-CM

## 2023-06-16 DIAGNOSIS — Z29.9 ENCOUNTER FOR PROPHYLACTIC MEASURES, UNSPECIFIED: ICD-10-CM

## 2023-06-16 DIAGNOSIS — Z01.818 ENCOUNTER FOR OTHER PREPROCEDURAL EXAMINATION: ICD-10-CM

## 2023-06-16 DIAGNOSIS — Z90.710 ACQUIRED ABSENCE OF BOTH CERVIX AND UTERUS: Chronic | ICD-10-CM

## 2023-06-16 LAB
A1C WITH ESTIMATED AVERAGE GLUCOSE RESULT: 5.8 % — HIGH (ref 4–5.6)
ANION GAP SERPL CALC-SCNC: 12 MMOL/L — SIGNIFICANT CHANGE UP (ref 5–17)
APTT BLD: 29.9 SEC — SIGNIFICANT CHANGE UP (ref 27.5–35.5)
BASOPHILS # BLD AUTO: 0.02 K/UL — SIGNIFICANT CHANGE UP (ref 0–0.2)
BASOPHILS NFR BLD AUTO: 0.2 % — SIGNIFICANT CHANGE UP (ref 0–2)
BLD GP AB SCN SERPL QL: SIGNIFICANT CHANGE UP
BUN SERPL-MCNC: 23.5 MG/DL — HIGH (ref 8–20)
CALCIUM SERPL-MCNC: 9.5 MG/DL — SIGNIFICANT CHANGE UP (ref 8.4–10.5)
CHLORIDE SERPL-SCNC: 106 MMOL/L — SIGNIFICANT CHANGE UP (ref 96–108)
CO2 SERPL-SCNC: 23 MMOL/L — SIGNIFICANT CHANGE UP (ref 22–29)
CREAT SERPL-MCNC: 0.71 MG/DL — SIGNIFICANT CHANGE UP (ref 0.5–1.3)
EGFR: 100 ML/MIN/1.73M2 — SIGNIFICANT CHANGE UP
EOSINOPHIL # BLD AUTO: 0.03 K/UL — SIGNIFICANT CHANGE UP (ref 0–0.5)
EOSINOPHIL NFR BLD AUTO: 0.3 % — SIGNIFICANT CHANGE UP (ref 0–6)
ESTIMATED AVERAGE GLUCOSE: 120 MG/DL — HIGH (ref 68–114)
GLUCOSE SERPL-MCNC: 104 MG/DL — HIGH (ref 70–99)
HCT VFR BLD CALC: 38.5 % — SIGNIFICANT CHANGE UP (ref 34.5–45)
HGB BLD-MCNC: 12.7 G/DL — SIGNIFICANT CHANGE UP (ref 11.5–15.5)
IMM GRANULOCYTES NFR BLD AUTO: 0.3 % — SIGNIFICANT CHANGE UP (ref 0–0.9)
INR BLD: 0.92 RATIO — SIGNIFICANT CHANGE UP (ref 0.88–1.16)
LYMPHOCYTES # BLD AUTO: 1.8 K/UL — SIGNIFICANT CHANGE UP (ref 1–3.3)
LYMPHOCYTES # BLD AUTO: 19 % — SIGNIFICANT CHANGE UP (ref 13–44)
MCHC RBC-ENTMCNC: 31.3 PG — SIGNIFICANT CHANGE UP (ref 27–34)
MCHC RBC-ENTMCNC: 33 GM/DL — SIGNIFICANT CHANGE UP (ref 32–36)
MCV RBC AUTO: 94.8 FL — SIGNIFICANT CHANGE UP (ref 80–100)
MONOCYTES # BLD AUTO: 0.57 K/UL — SIGNIFICANT CHANGE UP (ref 0–0.9)
MONOCYTES NFR BLD AUTO: 6 % — SIGNIFICANT CHANGE UP (ref 2–14)
NEUTROPHILS # BLD AUTO: 7 K/UL — SIGNIFICANT CHANGE UP (ref 1.8–7.4)
NEUTROPHILS NFR BLD AUTO: 74.2 % — SIGNIFICANT CHANGE UP (ref 43–77)
PLATELET # BLD AUTO: 233 K/UL — SIGNIFICANT CHANGE UP (ref 150–400)
POTASSIUM SERPL-MCNC: 4.1 MMOL/L — SIGNIFICANT CHANGE UP (ref 3.5–5.3)
POTASSIUM SERPL-SCNC: 4.1 MMOL/L — SIGNIFICANT CHANGE UP (ref 3.5–5.3)
PROTHROM AB SERPL-ACNC: 10.7 SEC — SIGNIFICANT CHANGE UP (ref 10.5–13.4)
RBC # BLD: 4.06 M/UL — SIGNIFICANT CHANGE UP (ref 3.8–5.2)
RBC # FLD: 13.8 % — SIGNIFICANT CHANGE UP (ref 10.3–14.5)
SODIUM SERPL-SCNC: 141 MMOL/L — SIGNIFICANT CHANGE UP (ref 135–145)
WBC # BLD: 9.45 K/UL — SIGNIFICANT CHANGE UP (ref 3.8–10.5)
WBC # FLD AUTO: 9.45 K/UL — SIGNIFICANT CHANGE UP (ref 3.8–10.5)

## 2023-06-16 PROCEDURE — 93010 ELECTROCARDIOGRAM REPORT: CPT

## 2023-06-16 PROCEDURE — 93005 ELECTROCARDIOGRAM TRACING: CPT

## 2023-06-16 PROCEDURE — G0463: CPT

## 2023-06-16 RX ORDER — SODIUM CHLORIDE 9 MG/ML
3 INJECTION INTRAMUSCULAR; INTRAVENOUS; SUBCUTANEOUS ONCE
Refills: 0 | Status: DISCONTINUED | OUTPATIENT
Start: 2023-06-22 | End: 2023-07-07

## 2023-06-16 RX ORDER — CEFAZOLIN SODIUM 1 G
2000 VIAL (EA) INJECTION ONCE
Refills: 0 | Status: DISCONTINUED | OUTPATIENT
Start: 2023-06-22 | End: 2023-07-07

## 2023-06-16 NOTE — H&P PST ADULT - NSICDXPASTSURGICALHX_GEN_ALL_CORE_FT
PAST SURGICAL HISTORY:  S/P hysterectomy      PAST SURGICAL HISTORY:  History of back surgery     S/P hysterectomy

## 2023-06-16 NOTE — H&P PST ADULT - HISTORY OF PRESENT ILLNESS
53 Year old female presents to PST today. The patient's medical history includes Pre-diabetes, insomnia.  Pt is a 53 year old female, Japanese speaking, , Post menopausal since  seen today pre-op for Laparoscopic Ovarian Cystectomy, Possible Oophorectomy on 23. Patient's medical hx includes pre-diabetes, and insomnia. Pt denies Fever/chills/night sweat, change in bowel and bladder, weight loss and any other related issues.

## 2023-06-16 NOTE — H&P PST ADULT - ASSESSMENT
CAPRINI SCORE    AGE RELATED RISK FACTORS                                                             [ ] Age 41-60 years                                            (1 Point)  [ ] Age: 61-74 years                                           (2 Points)                 [ ] Age= 75 years                                                (3 Points)             DISEASE RELATED RISK FACTORS                                                       [ ] Edema in the lower extremities                 (1 Point)                     [ ] Varicose veins                                               (1 Point)                                 [ ] BMI > 25 Kg/m2                                            (1 Point)                                  [ ] Serious infection (ie PNA)                            (1 Point)                     [ ] Lung disease ( COPD, Emphysema)            (1 Point)                                                                          [ ] Acute myocardial infarction                         (1 Point)                  [ ] Congestive heart failure (in the previous month)  (1 Point)         [ ] Inflammatory bowel disease                            (1 Point)                  [ ] Central venous access, PICC or Port               (2 points)       (within the last month)                                                                [ ] Stroke (in the previous month)                        (5 Points)    [ ] Previous or present malignancy                       (2 points)                                                                                                                                                         HEMATOLOGY RELATED FACTORS                                                         [ ] Prior episodes of VTE                                     (3 Points)                     [ ] Positive family history for VTE                      (3 Points)                  [ ] Prothrombin 40825 A                                     (3 Points)                     [ ] Factor V Leiden                                                (3 Points)                        [ ] Lupus anticoagulants                                      (3 Points)                                                           [ ] Anticardiolipin antibodies                              (3 Points)                                                       [ ] High homocysteine in the blood                   (3 Points)                                             [ ] Other congenital or acquired thrombophilia      (3 Points)                                                [ ] Heparin induced thrombocytopenia                  (3 Points)                                        MOBILITY RELATED FACTORS  [ ] Bed rest                                                         (1 Point)  [ ] Plaster cast                                                    (2 points)  [ ] Bed bound for more than 72 hours           (2 Points)    GENDER SPECIFIC FACTORS  [ ] Pregnancy or had a baby within the last month   (1 Point)  [ ] Post-partum < 6 weeks                                   (1 Point)  [ ] Hormonal therapy  or oral contraception   (1 Point)  [ ] History of pregnancy complications              (1 point)  [ ] Unexplained or recurrent              (1 Point)    OTHER RISK FACTORS                                           (1 Point)  [ ] BMI >40, smoking, diabetes requiring insulin, chemotherapy  blood transfusions and length of surgery over 2 hours    SURGERY RELATED RISK FACTORS  [ ]  Section within the last month     (1 Point)  [ ] Minor surgery                                                  (1 Point)  [ ] Arthroscopic surgery                                       (2 Points)  [ ] Planned major surgery lasting more            (2 Points)      than 45 minutes     [ ] Elective hip or knee joint replacement       (5 points)       surgery                                                TRAUMA RELATED RISK FACTORS  [ ] Fracture of the hip, pelvis, or leg                       (5 Points)  [ ] Spinal cord injury resulting in paralysis             (5 points)       (in the previous month)    [ ] Paralysis  (less than 1 month)                             (5 Points)  [ ] Multiple Trauma within 1 month                        (5 Points)    Total Score [        ]    Caprini Score 0-2: Low Risk, NO VTE prophylaxis required for most patients, encourage ambulation  Caprini Score 3-6: Moderate Risk , pharmacologic VTE prophylaxis is indicated for most patients (in the absence of contraindications)  Caprini Score Greater than or =7: High risk, pharmocologic VTE prophylaxis indicated for most patients (in the absence of contraindications)                              OPIOID RISK TOOL    CALLI EACH BOX THAT APPLIES AND ADD TOTALS AT THE END    FAMILY HISTORY OF SUBSTANCE ABUSE                 FEMALE         MALE                                                Alcohol                             [  ]1 pt          [  ]3pts                                               Illegal Durgs                     [  ]2 pts        [  ]3pts                                               Rx Drugs                           [  ]4 pts        [  ]4 pts    PERSONAL HISTORY OF SUBSTANCE ABUSE                                                                                          Alcohol                             [  ]3 pts       [  ]3 pts                                               Illegal Drugs                     [  ]4 pts        [  ]4 pts                                               Rx Drugs                           [  ]5 pts        [  ]5 pts    AGE BETWEEN 16-45 YEARS                                      [  ]1 pt         [  ]1 pt    HISTORY OF PREADOLESCENT   SEXUAL ABUSE                                                             [  ]3 pts        [  ]0pts    PSYCHOLOGICAL DISEASE                     ADD, OCD, Bipolar, Schizophrenia        [  ]2 pts         [  ]2 pts                      Depression                                               [  ]1 pt           [  ]1 pt           SCORING TOTAL   (add numbers and type here)              (***)                                     A score of 3 or lower indicated LOW risk for future opioid abuse  A score of 4 to 7 indicated moderate risk for future opioid abuse  A score of 8 or higher indicates a high risk for opioid abuse     Pt is a 53 year old female, Serbian speaking, , Post menopausal since  seen today pre-op for Laparoscopic Ovarian Cystectomy, Possible Oophorectomy on 23. Patient's medical hx includes pre-diabetes, and insomnia. Pt denies Fever/chills/night sweat, change in bowel and bladder, weight loss and any other related issues.        CAPRINI SCORE    AGE RELATED RISK FACTORS                                                             [x ] Age 41-60 years                                            (1 Point)  [ ] Age: 61-74 years                                           (2 Points)                 [ ] Age= 75 years                                                (3 Points)             DISEASE RELATED RISK FACTORS                                                       [ ] Edema in the lower extremities                 (1 Point)                     [ ] Varicose veins                                               (1 Point)                                 [ ] BMI > 25 Kg/m2                                            (1 Point)                                  [ ] Serious infection (ie PNA)                            (1 Point)                     [ ] Lung disease ( COPD, Emphysema)            (1 Point)                                                                          [ ] Acute myocardial infarction                         (1 Point)                  [ ] Congestive heart failure (in the previous month)  (1 Point)         [ ] Inflammatory bowel disease                            (1 Point)                  [ ] Central venous access, PICC or Port               (2 points)       (within the last month)                                                                [ ] Stroke (in the previous month)                        (5 Points)    [ ] Previous or present malignancy                       (2 points)                                                                                                                                                         HEMATOLOGY RELATED FACTORS                                                         [ ] Prior episodes of VTE                                     (3 Points)                     [ ] Positive family history for VTE                      (3 Points)                  [ ] Prothrombin 26490 A                                     (3 Points)                     [ ] Factor V Leiden                                                (3 Points)                        [ ] Lupus anticoagulants                                      (3 Points)                                                           [ ] Anticardiolipin antibodies                              (3 Points)                                                       [ ] High homocysteine in the blood                   (3 Points)                                             [ ] Other congenital or acquired thrombophilia      (3 Points)                                                [ ] Heparin induced thrombocytopenia                  (3 Points)                                        MOBILITY RELATED FACTORS  [ ] Bed rest                                                         (1 Point)  [ ] Plaster cast                                                    (2 points)  [ ] Bed bound for more than 72 hours           (2 Points)    GENDER SPECIFIC FACTORS  [ ] Pregnancy or had a baby within the last month   (1 Point)  [ ] Post-partum < 6 weeks                                   (1 Point)  [ ] Hormonal therapy  or oral contraception   (1 Point)  [ ] History of pregnancy complications              (1 point)  [ ] Unexplained or recurrent              (1 Point)    OTHER RISK FACTORS                                           (1 Point)  [ ] BMI >40, smoking, diabetes requiring insulin, chemotherapy  blood transfusions and length of surgery over 2 hours    SURGERY RELATED RISK FACTORS  [ ]  Section within the last month     (1 Point)  [ ] Minor surgery                                                  (1 Point)  [ ] Arthroscopic surgery                                       (2 Points)  [x ] Planned major surgery lasting more            (2 Points)      than 45 minutes     [ ] Elective hip or knee joint replacement       (5 points)       surgery                                                TRAUMA RELATED RISK FACTORS  [ ] Fracture of the hip, pelvis, or leg                       (5 Points)  [ ] Spinal cord injury resulting in paralysis             (5 points)       (in the previous month)    [ ] Paralysis  (less than 1 month)                             (5 Points)  [ ] Multiple Trauma within 1 month                        (5 Points)    Total Score [  3      ]    Caprini Score 0-2: Low Risk, NO VTE prophylaxis required for most patients, encourage ambulation  Caprini Score 3-6: Moderate Risk , pharmacologic VTE prophylaxis is indicated for most patients (in the absence of contraindications)  Caprini Score Greater than or =7: High risk, pharmocologic VTE prophylaxis indicated for most patients (in the absence of contraindications)    OPIOID RISK TOOL    CALLI EACH BOX THAT APPLIES AND ADD TOTALS AT THE END    FAMILY HISTORY OF SUBSTANCE ABUSE                 FEMALE         MALE                                                Alcohol                             [  ]1 pt          [  ]3pts                                               Illegal Durgs                     [  ]2 pts        [  ]3pts                                               Rx Drugs                           [  ]4 pts        [  ]4 pts    PERSONAL HISTORY OF SUBSTANCE ABUSE                                                                                          Alcohol                             [  ]3 pts       [  ]3 pts                                               Illegal Drugs                     [  ]4 pts        [  ]4 pts                                               Rx Drugs                           [  ]5 pts        [  ]5 pts    AGE BETWEEN 16-45 YEARS                                      [  ]1 pt         [  ]1 pt    HISTORY OF PREADOLESCENT   SEXUAL ABUSE                                                             [  ]3 pts        [  ]0pts    PSYCHOLOGICAL DISEASE                     ADD, OCD, Bipolar, Schizophrenia        [  ]2 pts         [  ]2 pts                      Depression                                               [  ]1 pt           [  ]1 pt           SCORING TOTAL   (add numbers and type here)              (*0**)                                     A score of 3 or lower indicated LOW risk for future opioid abuse  A score of 4 to 7 indicated moderate risk for future opioid abuse  A score of 8 or higher indicates a high risk for opioid abuse

## 2023-06-16 NOTE — H&P PST ADULT - NSICDXPASTMEDICALHX_GEN_ALL_CORE_FT
PAST MEDICAL HISTORY:  Fibroid, uterine      PAST MEDICAL HISTORY:  Fibroid, uterine     Insomnia     Ovarian cyst

## 2023-06-16 NOTE — H&P PST ADULT - PATIENT'S PREFERRED PRONOUN
Overall medical work-up today was extremely reassuring.  The antibiotic you were given for urinary tract infection is a single dose treatment and you should not need any more treatment.  Follow-up with your PCP regarding very mild hypothyroidism that we discovered today.  Your free T4 here was 0.92 and 0.93 is the low threshold of normal.  While today's work-up was reassuring should your symptoms change or worsen please return to the ED or seek other medical care.  
Her/She

## 2023-06-21 ENCOUNTER — TRANSCRIPTION ENCOUNTER (OUTPATIENT)
Age: 53
End: 2023-06-21

## 2023-06-22 ENCOUNTER — TRANSCRIPTION ENCOUNTER (OUTPATIENT)
Age: 53
End: 2023-06-22

## 2023-06-22 ENCOUNTER — OUTPATIENT (OUTPATIENT)
Dept: OUTPATIENT SERVICES | Facility: HOSPITAL | Age: 53
LOS: 1 days | End: 2023-06-22
Payer: COMMERCIAL

## 2023-06-22 VITALS
HEART RATE: 68 BPM | RESPIRATION RATE: 15 BRPM | DIASTOLIC BLOOD PRESSURE: 80 MMHG | SYSTOLIC BLOOD PRESSURE: 130 MMHG | HEIGHT: 61 IN | TEMPERATURE: 98 F | OXYGEN SATURATION: 100 % | WEIGHT: 138.89 LBS

## 2023-06-22 VITALS
HEART RATE: 63 BPM | OXYGEN SATURATION: 100 % | DIASTOLIC BLOOD PRESSURE: 55 MMHG | RESPIRATION RATE: 14 BRPM | SYSTOLIC BLOOD PRESSURE: 135 MMHG

## 2023-06-22 DIAGNOSIS — Z98.890 OTHER SPECIFIED POSTPROCEDURAL STATES: Chronic | ICD-10-CM

## 2023-06-22 DIAGNOSIS — N83.209 UNSPECIFIED OVARIAN CYST, UNSPECIFIED SIDE: ICD-10-CM

## 2023-06-22 DIAGNOSIS — Z90.710 ACQUIRED ABSENCE OF BOTH CERVIX AND UTERUS: Chronic | ICD-10-CM

## 2023-06-22 LAB
GLUCOSE BLDC GLUCOMTR-MCNC: 121 MG/DL — HIGH (ref 70–99)
GLUCOSE BLDC GLUCOMTR-MCNC: 89 MG/DL — SIGNIFICANT CHANGE UP (ref 70–99)
GLUCOSE BLDC GLUCOMTR-MCNC: 97 MG/DL — SIGNIFICANT CHANGE UP (ref 70–99)

## 2023-06-22 PROCEDURE — 58661 LAPAROSCOPY REMOVE ADNEXA: CPT

## 2023-06-22 PROCEDURE — 36415 COLL VENOUS BLD VENIPUNCTURE: CPT

## 2023-06-22 PROCEDURE — 88307 TISSUE EXAM BY PATHOLOGIST: CPT | Mod: 26

## 2023-06-22 PROCEDURE — 82962 GLUCOSE BLOOD TEST: CPT

## 2023-06-22 PROCEDURE — 88307 TISSUE EXAM BY PATHOLOGIST: CPT

## 2023-06-22 RX ORDER — HYDROMORPHONE HYDROCHLORIDE 2 MG/ML
0.5 INJECTION INTRAMUSCULAR; INTRAVENOUS; SUBCUTANEOUS
Refills: 0 | Status: DISCONTINUED | OUTPATIENT
Start: 2023-06-22 | End: 2023-06-23

## 2023-06-22 RX ORDER — CELECOXIB 200 MG/1
200 CAPSULE ORAL ONCE
Refills: 0 | Status: COMPLETED | OUTPATIENT
Start: 2023-06-22 | End: 2023-06-22

## 2023-06-22 RX ORDER — ONDANSETRON 8 MG/1
4 TABLET, FILM COATED ORAL ONCE
Refills: 0 | Status: DISCONTINUED | OUTPATIENT
Start: 2023-06-22 | End: 2023-06-23

## 2023-06-22 RX ORDER — QUETIAPINE FUMARATE 200 MG/1
1 TABLET, FILM COATED ORAL
Refills: 0 | DISCHARGE

## 2023-06-22 RX ORDER — ACETAMINOPHEN 500 MG
975 TABLET ORAL ONCE
Refills: 0 | Status: COMPLETED | OUTPATIENT
Start: 2023-06-22 | End: 2023-06-22

## 2023-06-22 RX ORDER — METFORMIN HYDROCHLORIDE 850 MG/1
1 TABLET ORAL
Refills: 0 | DISCHARGE

## 2023-06-22 RX ORDER — ACETAMINOPHEN 500 MG
3 TABLET ORAL
Qty: 84 | Refills: 0
Start: 2023-06-22 | End: 2023-06-28

## 2023-06-22 RX ORDER — FENTANYL CITRATE 50 UG/ML
25 INJECTION INTRAVENOUS
Refills: 0 | Status: DISCONTINUED | OUTPATIENT
Start: 2023-06-22 | End: 2023-06-23

## 2023-06-22 RX ORDER — SODIUM CHLORIDE 9 MG/ML
1000 INJECTION, SOLUTION INTRAVENOUS
Refills: 0 | Status: DISCONTINUED | OUTPATIENT
Start: 2023-06-22 | End: 2023-06-23

## 2023-06-22 RX ORDER — KETOROLAC TROMETHAMINE 30 MG/ML
1 SYRINGE (ML) INJECTION
Qty: 12 | Refills: 0
Start: 2023-06-22 | End: 2023-06-24

## 2023-06-22 RX ADMIN — Medication 975 MILLIGRAM(S): at 14:43

## 2023-06-22 RX ADMIN — FENTANYL CITRATE 25 MICROGRAM(S): 50 INJECTION INTRAVENOUS at 21:00

## 2023-06-22 RX ADMIN — CELECOXIB 200 MILLIGRAM(S): 200 CAPSULE ORAL at 14:42

## 2023-06-22 RX ADMIN — FENTANYL CITRATE 25 MICROGRAM(S): 50 INJECTION INTRAVENOUS at 20:20

## 2023-06-22 RX ADMIN — FENTANYL CITRATE 25 MICROGRAM(S): 50 INJECTION INTRAVENOUS at 20:22

## 2023-06-22 RX ADMIN — FENTANYL CITRATE 25 MICROGRAM(S): 50 INJECTION INTRAVENOUS at 20:05

## 2023-06-22 NOTE — ASU DISCHARGE PLAN (ADULT/PEDIATRIC) - PATIENT BELONGINGS
[facility]  Nurses Note -- 4 Eyes      3/30/2023       Skin assessed on: Q Shift      [x] No Pressure Injuries Present    [x]Prevention Measures Documented    [] Yes LDA  for Pressure Injury Previously documented     [] Yes New Pressure Injury Discovered   [] LDA for New Pressure Injury Added      Attending RN:  Yadi Fenton RN     Second RN:  Caroline Koehler RN         Patient's belongings returned

## 2023-06-22 NOTE — BRIEF OPERATIVE NOTE - NSICDXBRIEFPROCEDURE_GEN_ALL_CORE_FT
PROCEDURES:  Laparoscopic right oophorectomy for ovarian cyst 22-Jun-2023 20:24:59  Piedad Carlson

## 2023-06-22 NOTE — ASU DISCHARGE PLAN (ADULT/PEDIATRIC) - CARE PROVIDER_API CALL
Vika River  Obstetrics and Gynecology  [address], [address]  [place] [address]  Phone: [phone]  Fax: [phone]  Follow Up Time: 1 week

## 2023-06-30 LAB — SURGICAL PATHOLOGY STUDY: SIGNIFICANT CHANGE UP

## 2023-10-27 ENCOUNTER — EMERGENCY (EMERGENCY)
Facility: HOSPITAL | Age: 53
LOS: 1 days | Discharge: HOME | End: 2023-10-27
Attending: EMERGENCY MEDICINE
Payer: COMMERCIAL

## 2023-10-27 VITALS
RESPIRATION RATE: 18 BRPM | TEMPERATURE: 98 F | OXYGEN SATURATION: 98 % | SYSTOLIC BLOOD PRESSURE: 144 MMHG | DIASTOLIC BLOOD PRESSURE: 90 MMHG | WEIGHT: 139.99 LBS | HEART RATE: 81 BPM | HEIGHT: 62 IN

## 2023-10-27 DIAGNOSIS — Z98.890 OTHER SPECIFIED POSTPROCEDURAL STATES: Chronic | ICD-10-CM

## 2023-10-27 DIAGNOSIS — Z90.710 ACQUIRED ABSENCE OF BOTH CERVIX AND UTERUS: Chronic | ICD-10-CM

## 2023-10-27 PROBLEM — N83.209 UNSPECIFIED OVARIAN CYST, UNSPECIFIED SIDE: Chronic | Status: ACTIVE | Noted: 2023-06-16

## 2023-10-27 PROBLEM — G47.00 INSOMNIA, UNSPECIFIED: Chronic | Status: ACTIVE | Noted: 2023-06-16

## 2023-10-27 PROCEDURE — 99283 EMERGENCY DEPT VISIT LOW MDM: CPT

## 2023-10-27 PROCEDURE — 99282 EMERGENCY DEPT VISIT SF MDM: CPT

## 2023-10-27 RX ORDER — ACETAMINOPHEN 500 MG
650 TABLET ORAL ONCE
Refills: 0 | Status: COMPLETED | OUTPATIENT
Start: 2023-10-27 | End: 2023-10-27

## 2023-10-27 RX ORDER — LIDOCAINE 4 G/100G
5 CREAM TOPICAL ONCE
Refills: 0 | Status: COMPLETED | OUTPATIENT
Start: 2023-10-27 | End: 2023-10-27

## 2023-10-27 RX ADMIN — Medication 650 MILLIGRAM(S): at 05:29

## 2023-10-27 RX ADMIN — LIDOCAINE 5 MILLILITER(S): 4 CREAM TOPICAL at 05:29

## 2023-10-27 NOTE — ED ADULT NURSE NOTE - OBJECTIVE STATEMENT
patient is a/ox3 stating throat pain after a recent biopsy. patient states feeling like something is in her throat and pain does not alter with eating/drinking. patient denies shortness of breath, chest pain, fever, n/v/d. patient makes no other complaints.

## 2023-10-27 NOTE — ED PROVIDER NOTE - PHYSICAL EXAMINATION
Gen: No acute distress, non toxic  HEENT: Mucous membranes moist, pink conjunctivae, EOMI, NC/AT,  Supple, FROM. No signs of nuchal rigidity, No discharge. Tonsils and pharynx without erythema or exudates. Tonsils not enlarged. Uvula midline   CV: RRR, nl s1/s2.  Resp: CTAB, normal rate and effort  GI: Abdomen soft, NT, ND. No rebound, no guarding  Neuro: A&O x 3, moving all 4 extremities  MSK: No obvious deformities  Skin: No rashes. intact and perfused.

## 2023-10-27 NOTE — ED PROVIDER NOTE - NSFOLLOWUPINSTRUCTIONS_ED_ALL_ED_FT
- Follow up with GI  - Return to emergency department for new or worsening symptoms. - Seguimiento con GI  - Regrese al departamento de emergencias si los síntomas aparecen o empeoran.    BUSQUE ATENCIÓN MÉDICA INMEDIATA SI TIENE ALGUNO DE LOS SIGUIENTES SÍNTOMAS: rigidez del roman, babeo, ronquera o cambio de voz, incapacidad para tragar líquidos, vómitos o dificultad para respirar.    - Follow up with GI  - Return to emergency department for new or worsening symptoms.        SEEK IMMEDIATE MEDICAL CARE IF YOU HAVE ANY OF THE FOLLOWING SYMPTOMS: neck stiffness, drooling, hoarseness or change in voice, inability to swallow liquids, vomiting, or trouble breathing.

## 2023-10-27 NOTE — ED PROVIDER NOTE - CLINICAL SUMMARY MEDICAL DECISION MAKING FREE TEXT BOX
53y female PMHx "esophagus inflammation" present to ED for throat discomfort. Pt reports she had biopsy done by GI one month ago, since that time has experiences discomfort in throat. pt states discomfort is made no better or worse with solids or liquids, feels like something is in there. pt reports having follow up on 10/18 but missed the appointment and was considering making appointment for today. pt denies difficulty swallowing, breathing, swelling, shortness of breath, chest pain, HA, fever, chills, cough, congestion.  pt able to speak in full sentences, tolerate fluids. airway patent. No signs or symptoms of infection, VSS.   Symptomatic control, out patient follow up

## 2023-10-27 NOTE — ED PROVIDER NOTE - OBJECTIVE STATEMENT
53y female PMHx "esophagus inflammation" present to ED for throat discomfort. Pt reprots she had biopsy done by GI one month ago, since that time has experiences discomfort in throat. pt states discomfort is made no better or worse with solids or liquids, feels like something is in there. pt reprots having follow up on 10/18 but missed the appointment and was considering making appointment for today. pt denies difficulty swallowing, breathing, swelling, shortness of breath, chest pain, HA, fever, chills, cough, congestion.

## 2023-10-27 NOTE — ED PROVIDER NOTE - CARE PROVIDER_API CALL
Abdomen soft, non-tender, no guarding.
Cali Steel  Gastroenterology  [address], [address]  [place] [address]  Phone: [phone]  Fax: [phone]  Follow Up Time:     Alonso Bro  Otolaryngology  [address], [address]  [place] [address]  Phone: [phone]  Fax: [phone]  Follow Up Time:

## 2023-10-27 NOTE — ED ADULT TRIAGE NOTE - CHIEF COMPLAINT QUOTE
pt c/o right side throat pain, had a biopsy recently , but last 2 days has a very sharp pain  A&Ox3, resp wnl, denies difficulty swallowing, No resp distress, right side of neck slight swelling noted

## 2023-10-27 NOTE — ED ADULT NURSE NOTE - DISCHARGE DATE/TIME
Nephrology Progress Note      Patient: Claude Benites               Sex: male            MRN:  [id number]      YOB: 1958      Age:  64 year old           Date: 2023    Assessment/Plan   Claude Benites is a 64-year-old -American male with history of cardiomyopathy and chronic kidney disease.  He underwent a heart transplant on 2022 and a  donor kidney transplant 2022.  He initially had an LVAD that was implanted 2021.  His etiology of chronic kidney disease was felt to be hypertension and chronic cardiorenal syndrome.  His initial post heart kidney transplant course was complicated by RV dysfunction requiring ECMO support, mediastinal hematoma, need for CVVH, multiple occlusive thrombi in upper and lower extremities with SVC syndrome.  He had a long hospital stay and was finally discharged towards the end of October.  More recently he has been dealing with leukopenia and CMV viremia since 2022 and is back on Valcyte.  He was taken off mycophenolate at at 1 point and has only been on tacrolimus monotherapy and then steroids were resumed.  Fortunately his heart biopsies have not shown any signs of rejection, his AlloSure's have been reassuring and his ATP assays have suggested adequate immunosuppression.    # Renal allograft.  The patient underwent  donor kidney transplant after heart transplant on 2022.  Pretransplant he had a creatinine of 2-3, no proteinuria reasonable imaging and presumed CKD stage IV due to hypertension and cardiorenal syndrome.    Post transplant he developed ATN and required dialysis support for several weeks.  Presently his creatinine fluctuates between 1.5 and 2 mg/dL since 2022.  At last check in October he had about 700 mg of proteinuria.  Imaging in the past has shown a stable renal allograft  --His creatinine is  Slightly elevated above pre op baseline due to cardiorenal syndrome, possible  calcineurin associated renal vasoconstriction. His new baseline cr is probably around 1.7 mg/dl  -- He needs a urinalysis and urine p/c ratio every month  -- Continue to avoid tacrolimus levels above 10 if possible  -- If proteinuria persists > 500 mg, I suggest he start an ACE inhibitor  -- Repeat PTH and Vit D levels are pending . Has  osteopenia and previous lumbar fx . He is on ergocalciferol.  Monitor and replace accordingly    #Heart transplant.  August 12, 2022.  Chart review indicates good allograft function with no evidence of rejection  - per heart tx team    #Immunosuppression.  He was induced with Thymoglobulin and Solu-Medrol.  He was maintained on mycophenolate, tacrolimus and prednisone.  DSA negative, Allosure noted and reassuring, ATP assay < 100  -- Taken off mycophenolate in January 2023 in the setting of leukopenia and CMV viremia  -- Presently on tacrolimus and steroids, tacrolimus levels reviewed    #Venous thromboembolic events.  He had multiple upper and lower extremity DVTs and superficial and deep veins in the postoperative setting.  -- Has had some improvement based on recent imaging, remains on Xarelto and follows up with hematology  - clinically no edema     #CMV viremia  -- Titers are declining, he is off Myfortic, he is on Valcyte    # Hypertension   - on 2 meds, Amlodipine 5/5, Cardura 8/8  --BP high in office today  Weight is 190 #, peak was 240 # in late 2022          Tod Mao MD  2/23/2023 3:11 PM  Phone [phone]  Office [phone]    Subjective:   02/23/23 :   Claude returns to the kidney transplant office for a follow-up visit.  We last saw him in the hospital in October when he is being discharged from inpatient rehabilitation.  He was most recently in the hospital for a surveillance endomyocardial biopsy.  He tolerated the procedure well.  Claude is doing reasonably well.  His mood is good.  His medications and labs were reviewed.  His creatinine is generally remaining  below 2 mg/dL since October 2022 , has gone as low as 1.5 mg/dL.  Voiding okay, no hematuria  Leg edema has resolved completely. Has collateral veins over chest wall.    PMHx, FHx, SHx, and review of systems reviewed and otherwise unchanged.    Reviewed on 2/23/2023      Objective:   There were no vitals taken for this visit.   [unfilled]    Physical Exam:  Constitutional :Patient is awake and interactive, Vitals noted.  Eyes: Conjunctiva normal, Eyelids not drooping.  ENT: Normal hearing. Nose not deformed, moist oral mucosa.  Neck: No obvious mass, no lymphadenopathy.  Cardiovascular : Rate and rhythm is regular. No murmur. Peripheral edema is absent.  Respiratory: Lungs are clear to auscultation. Equal Breath sounds bilaterally.  GI : Abdomen is soft, non tender. There is no obvious ascites.  : External genitalia not examined. There is no Cole catheter in place  Musculoskeletal :No obvious deformity or swelling of joints. There is no ankle edema.  Skin: No rashes noted.  Neuro:Patient is alert and oriented times 3. There is no obvious focal deficit.  Pysch: Cooperative, Affect is normal      No current facility-administered medications for this visit.     Lab/Data Reviewed:    CBC:   Recent Labs   Lab 02/21/23  0808 02/09/23  0817 02/01/23  0802 01/24/23  0718 01/04/23  0653 12/28/22  0755 09/12/22  0327 09/11/22  0311 09/10/22  0315 09/09/22  2050   WBC 4.4 3.9* 2.3* 2.2*   < > 7.6   < > 7.8 8.0 10.3   RBC 4.63 4.74 5.21 4.84   < > 4.77   < > 3.30* 3.45* 3.80*   HGB 12.2* 12.2* 13.4 12.7*   < > 12.3*   < > 9.4* 9.9* 10.8*   HCT 41.2 42.1 45.6 42.6   < > 42.2   < > 31.6* 32.9* 36.5*   MCV 89.0 88.8 87.5 88.0   < > 88.5   < > 95.8 95.4 96.1   MCHC 29.6* 29.0* 29.4* 29.8*   < > 29.1*   < > 29.7* 30.1* 29.6*   RDW-CV 17.1* 17.1* 16.7* 16.0*   < > 15.3*   < > 16.6* 17.2* 17.0*    235 201 172   < > 150   < > 144 169 197   Lymphocytes, Percent  --   --   --   --   --  8  --  2 2 6    < > = values in this  interval not displayed.     CMP:  @LABRCNTIP(SODIUM:3,POTASSIUM:3,CHLORIDE:3,CO2:3,BUN:3,CREATININE:3,GFR:3,GLUCOSE:3,CAPTH:3,ALBUMIN:3,PHOS:3, AST:3,GPT:3,ALKPT:3,BILIRUBIN:3,TP:3,MG:3)@  Magnesium   Date Value Ref Range Status   02/21/2023 2.2 1.7 - 2.4 mg/dL Final   02/09/2023 2.9 (H) 1.7 - 2.4 mg/dL Final   02/01/2023 2.1 1.7 - 2.4 mg/dL Final     Phosphorus   Date Value Ref Range Status   09/23/2022 2.6 2.4 - 4.7 mg/dL Final   09/17/2022 3.2 2.4 - 4.7 mg/dL Final   09/14/2022 3.8 2.4 - 4.7 mg/dL Final          27-Oct-2023 06:12

## 2023-10-27 NOTE — ED PROVIDER NOTE - PATIENT PORTAL LINK FT
You can access the FollowMyHealth Patient Portal offered by [facility] by registering at the following website: [email or web address]. By joining [facility] [employer]’s FollowMyHealth portal, you will also be able to view your health information using other applications (apps) compatible with our system.

## 2023-10-27 NOTE — ED PROVIDER NOTE - CARE PROVIDERS DIRECT ADDRESSES
[email or web address]

## 2023-11-13 ENCOUNTER — APPOINTMENT (OUTPATIENT)
Dept: GASTROENTEROLOGY | Facility: CLINIC | Age: 53
End: 2023-11-13
Payer: MEDICAID

## 2023-11-13 VITALS
SYSTOLIC BLOOD PRESSURE: 135 MMHG | WEIGHT: 138 LBS | BODY MASS INDEX: 26.06 KG/M2 | HEART RATE: 74 BPM | HEIGHT: 61 IN | DIASTOLIC BLOOD PRESSURE: 96 MMHG | RESPIRATION RATE: 15 BRPM | OXYGEN SATURATION: 98 %

## 2023-11-13 DIAGNOSIS — K21.9 GASTRO-ESOPHAGEAL REFLUX DISEASE W/OUT ESOPHAGITIS: ICD-10-CM

## 2023-11-13 DIAGNOSIS — R10.13 EPIGASTRIC PAIN: ICD-10-CM

## 2023-11-13 PROCEDURE — 99213 OFFICE O/P EST LOW 20 MIN: CPT

## 2023-11-13 PROCEDURE — 99203 OFFICE O/P NEW LOW 30 MIN: CPT

## 2023-11-13 RX ORDER — METFORMIN HYDROCHLORIDE 500 MG/1
500 TABLET, COATED ORAL
Refills: 0 | Status: ACTIVE | COMMUNITY

## 2023-11-13 RX ORDER — QUETIAPINE FUMARATE 400 MG/1
TABLET ORAL
Refills: 0 | Status: ACTIVE | COMMUNITY

## 2023-11-13 RX ORDER — MELOXICAM 15 MG/1
15 TABLET ORAL
Qty: 21 | Refills: 0 | Status: DISCONTINUED | COMMUNITY
Start: 2021-08-02 | End: 2023-11-13

## 2023-11-13 RX ORDER — GABAPENTIN 100 MG/1
100 CAPSULE ORAL
Refills: 0 | Status: DISCONTINUED | COMMUNITY
Start: 2023-02-27 | End: 2023-11-13

## 2023-11-13 RX ORDER — FAMOTIDINE 40 MG/1
40 TABLET, FILM COATED ORAL
Qty: 30 | Refills: 5 | Status: ACTIVE | COMMUNITY
Start: 2023-11-13 | End: 1900-01-01

## 2023-11-13 RX ORDER — METHOCARBAMOL 750 MG/1
750 TABLET, FILM COATED ORAL
Qty: 21 | Refills: 0 | Status: DISCONTINUED | COMMUNITY
Start: 2021-11-23 | End: 2023-11-13

## 2023-11-13 RX ORDER — NAPROXEN 500 MG/1
500 TABLET ORAL
Qty: 60 | Refills: 1 | Status: DISCONTINUED | COMMUNITY
Start: 2021-10-15 | End: 2023-11-13

## 2023-11-13 RX ORDER — FAMOTIDINE 20 MG/1
20 TABLET, FILM COATED ORAL
Qty: 60 | Refills: 2 | Status: DISCONTINUED | COMMUNITY
Start: 2023-02-27 | End: 2023-11-13

## 2023-12-01 ENCOUNTER — APPOINTMENT (OUTPATIENT)
Dept: OTOLARYNGOLOGY | Facility: CLINIC | Age: 53
End: 2023-12-01
Payer: MEDICAID

## 2023-12-01 VITALS
DIASTOLIC BLOOD PRESSURE: 83 MMHG | WEIGHT: 138 LBS | HEIGHT: 62 IN | BODY MASS INDEX: 25.4 KG/M2 | SYSTOLIC BLOOD PRESSURE: 126 MMHG | HEART RATE: 73 BPM

## 2023-12-01 DIAGNOSIS — K21.9 GASTRO-ESOPHAGEAL REFLUX DISEASE W/OUT ESOPHAGITIS: ICD-10-CM

## 2023-12-01 PROCEDURE — 99203 OFFICE O/P NEW LOW 30 MIN: CPT | Mod: 25

## 2023-12-01 PROCEDURE — 31575 DIAGNOSTIC LARYNGOSCOPY: CPT

## 2024-05-02 NOTE — ED ADULT TRIAGE NOTE - MODE OF ARRIVAL
"  Assessment & Plan     Bilateral occipital neuralgia  Trial nerve block  - Pain Management  Referral; Future      CONSULTATION/REFERRAL to PAIN MGMT    FUTURE APPOINTMENTS:       - Follow-up visit in PCP in 1 mo.    See Patient Instructions    Time spent in chart review in preparation to see patient, time with patient for interview/exam, ordering medications/tests/and/or procedures, and time spent in charting and coordinating care: 30 minutes.       Subjective   Irene is a 74 year old, presenting for the following health issues:  Headache        5/2/2024     9:58 AM   Additional Questions   Roomed by Lis DICKERSON     History of Present Illness       Headaches:   Since the patient's last clinic visit, headaches are: worsened  The patient is getting headaches:  [place]  She is able to do normal daily activities when she has a migraine.  The patient is taking the following rescue/relief medications:  Tylenol and Excedrin   Patient states \"I get some relief\" from the rescue/relief medications.   The patient is taking the following medications to prevent migraines:  Other  In the past 4 weeks, the patient has gone to an Urgent Care or Emergency Room 0 times times due to headaches.    She eats 0-1 servings of fruits and vegetables daily.She consumes 1 sweetened beverage(s) daily.She exercises with enough effort to increase her heart rate 9 or less minutes per day.  She exercises with enough effort to increase her heart rate 3 or less days per week.   She is taking medications regularly.       Patient is here today with . Both are poor historians. Irene has a hx of Alzheimer's and a meningioma. She follows with Neurology. She reports severe headaches that radiate from temples to mid face. She is taking Excedrin and Tylenol daily. She reports the pain is sharp and stabbing. She denies sensitivity to light/sound. She denies vision changes. She reports some dizziness.       Review of Systems  Constitutional, HEENT, " "cardiovascular, pulmonary, gi and gu systems are negative, except as otherwise noted.      Objective    /76   Pulse 72   Temp 97.8  F (36.6  C) (Tympanic)   Resp 16   Ht 1.6 m (5' 3\")   Wt 55.3 kg (122 lb)   SpO2 98%   BMI 21.61 kg/m    Body mass index is 21.61 kg/m .  Physical Exam   GENERAL: alert and no distress  EYES: Eyes grossly normal to inspection, PERRL, conjunctivae and sclerae normal  HENT: ear canals and TM's normal, nose and mouth without ulcers or lesions  NECK: no adenopathy, no asymmetry, masses, or scars  RESP: lungs clear to auscultation - no rales, rhonchi or wheezes  CV: regular rate and rhythm, normal S1 S2, no S3 or S4, no murmur, click or rub, no peripheral edema  MS: tenderness to palpation bilateral TMJ's  SKIN: no suspicious lesions or rashes  NEURO: weakness of all extremities, fasciculations BLE, abnormal mental status - short term memory loss, and cranial nerves 2-12 intact            Signed Electronically by: NGOC Ríos CNP    " [employer] in

## 2024-05-23 ENCOUNTER — EMERGENCY (EMERGENCY)
Facility: HOSPITAL | Age: 54
LOS: 1 days | Discharge: HOME | End: 2024-05-23
Attending: EMERGENCY MEDICINE
Payer: COMMERCIAL

## 2024-05-23 VITALS
SYSTOLIC BLOOD PRESSURE: 92 MMHG | TEMPERATURE: 98 F | OXYGEN SATURATION: 100 % | HEIGHT: 61 IN | RESPIRATION RATE: 18 BRPM | HEART RATE: 99 BPM | DIASTOLIC BLOOD PRESSURE: 67 MMHG | WEIGHT: 125 LBS

## 2024-05-23 VITALS
SYSTOLIC BLOOD PRESSURE: 95 MMHG | OXYGEN SATURATION: 99 % | TEMPERATURE: 98 F | RESPIRATION RATE: 18 BRPM | DIASTOLIC BLOOD PRESSURE: 62 MMHG | HEART RATE: 68 BPM

## 2024-05-23 DIAGNOSIS — Z90.710 ACQUIRED ABSENCE OF BOTH CERVIX AND UTERUS: Chronic | ICD-10-CM

## 2024-05-23 DIAGNOSIS — Z98.890 OTHER SPECIFIED POSTPROCEDURAL STATES: Chronic | ICD-10-CM

## 2024-05-23 LAB
ALBUMIN SERPL ELPH-MCNC: 3.6 G/DL — SIGNIFICANT CHANGE UP (ref 3.3–5.2)
ALP SERPL-CCNC: 99 U/L — SIGNIFICANT CHANGE UP (ref 40–120)
ALT FLD-CCNC: 22 U/L — SIGNIFICANT CHANGE UP
ANION GAP SERPL CALC-SCNC: 13 MMOL/L — SIGNIFICANT CHANGE UP (ref 5–17)
AST SERPL-CCNC: 25 U/L — SIGNIFICANT CHANGE UP
BASOPHILS # BLD AUTO: 0.02 K/UL — SIGNIFICANT CHANGE UP (ref 0–0.2)
BASOPHILS NFR BLD AUTO: 0.2 % — SIGNIFICANT CHANGE UP (ref 0–2)
BILIRUB SERPL-MCNC: 0.5 MG/DL — SIGNIFICANT CHANGE UP (ref 0.4–2)
BUN SERPL-MCNC: 24.4 MG/DL — HIGH (ref 8–20)
CALCIUM SERPL-MCNC: 8.4 MG/DL — SIGNIFICANT CHANGE UP (ref 8.4–10.5)
CHLORIDE SERPL-SCNC: 104 MMOL/L — SIGNIFICANT CHANGE UP (ref 96–108)
CO2 SERPL-SCNC: 18 MMOL/L — LOW (ref 22–29)
CREAT SERPL-MCNC: 0.6 MG/DL — SIGNIFICANT CHANGE UP (ref 0.5–1.3)
EGFR: 105 ML/MIN/1.73M2 — SIGNIFICANT CHANGE UP
EOSINOPHIL # BLD AUTO: 0.04 K/UL — SIGNIFICANT CHANGE UP (ref 0–0.5)
EOSINOPHIL NFR BLD AUTO: 0.4 % — SIGNIFICANT CHANGE UP (ref 0–6)
GLUCOSE SERPL-MCNC: 98 MG/DL — SIGNIFICANT CHANGE UP (ref 70–99)
HCT VFR BLD CALC: 40.5 % — SIGNIFICANT CHANGE UP (ref 34.5–45)
HGB BLD-MCNC: 13.3 G/DL — SIGNIFICANT CHANGE UP (ref 11.5–15.5)
IMM GRANULOCYTES NFR BLD AUTO: 0.6 % — SIGNIFICANT CHANGE UP (ref 0–0.9)
LIDOCAIN IGE QN: 14 U/L — LOW (ref 22–51)
LYMPHOCYTES # BLD AUTO: 0.84 K/UL — LOW (ref 1–3.3)
LYMPHOCYTES # BLD AUTO: 7.7 % — LOW (ref 13–44)
MCHC RBC-ENTMCNC: 29.1 PG — SIGNIFICANT CHANGE UP (ref 27–34)
MCHC RBC-ENTMCNC: 32.8 GM/DL — SIGNIFICANT CHANGE UP (ref 32–36)
MCV RBC AUTO: 88.6 FL — SIGNIFICANT CHANGE UP (ref 80–100)
MONOCYTES # BLD AUTO: 0.73 K/UL — SIGNIFICANT CHANGE UP (ref 0–0.9)
MONOCYTES NFR BLD AUTO: 6.7 % — SIGNIFICANT CHANGE UP (ref 2–14)
NEUTROPHILS # BLD AUTO: 9.18 K/UL — HIGH (ref 1.8–7.4)
NEUTROPHILS NFR BLD AUTO: 84.4 % — HIGH (ref 43–77)
PLATELET # BLD AUTO: 207 K/UL — SIGNIFICANT CHANGE UP (ref 150–400)
POTASSIUM SERPL-MCNC: 3.5 MMOL/L — SIGNIFICANT CHANGE UP (ref 3.5–5.3)
POTASSIUM SERPL-SCNC: 3.5 MMOL/L — SIGNIFICANT CHANGE UP (ref 3.5–5.3)
PROT SERPL-MCNC: 6.3 G/DL — LOW (ref 6.6–8.7)
RBC # BLD: 4.57 M/UL — SIGNIFICANT CHANGE UP (ref 3.8–5.2)
RBC # FLD: 13.9 % — SIGNIFICANT CHANGE UP (ref 10.3–14.5)
SODIUM SERPL-SCNC: 134 MMOL/L — LOW (ref 135–145)
WBC # BLD: 10.87 K/UL — HIGH (ref 3.8–10.5)
WBC # FLD AUTO: 10.87 K/UL — HIGH (ref 3.8–10.5)

## 2024-05-23 PROCEDURE — 93010 ELECTROCARDIOGRAM REPORT: CPT

## 2024-05-23 PROCEDURE — 99284 EMERGENCY DEPT VISIT MOD MDM: CPT

## 2024-05-23 PROCEDURE — 93005 ELECTROCARDIOGRAM TRACING: CPT

## 2024-05-23 PROCEDURE — T1013: CPT

## 2024-05-23 PROCEDURE — 80053 COMPREHEN METABOLIC PANEL: CPT

## 2024-05-23 PROCEDURE — 96374 THER/PROPH/DIAG INJ IV PUSH: CPT

## 2024-05-23 PROCEDURE — 96375 TX/PRO/DX INJ NEW DRUG ADDON: CPT

## 2024-05-23 PROCEDURE — 85025 COMPLETE CBC W/AUTO DIFF WBC: CPT

## 2024-05-23 PROCEDURE — 83690 ASSAY OF LIPASE: CPT

## 2024-05-23 PROCEDURE — 99284 EMERGENCY DEPT VISIT MOD MDM: CPT | Mod: 25

## 2024-05-23 PROCEDURE — 36415 COLL VENOUS BLD VENIPUNCTURE: CPT

## 2024-05-23 RX ORDER — ONDANSETRON 8 MG/1
1 TABLET, FILM COATED ORAL
Qty: 3 | Refills: 0
Start: 2024-05-23

## 2024-05-23 RX ORDER — ONDANSETRON 8 MG/1
4 TABLET, FILM COATED ORAL ONCE
Refills: 0 | Status: COMPLETED | OUTPATIENT
Start: 2024-05-23 | End: 2024-05-23

## 2024-05-23 RX ORDER — SODIUM CHLORIDE 9 MG/ML
1000 INJECTION INTRAMUSCULAR; INTRAVENOUS; SUBCUTANEOUS ONCE
Refills: 0 | Status: COMPLETED | OUTPATIENT
Start: 2024-05-23 | End: 2024-05-23

## 2024-05-23 RX ORDER — ACETAMINOPHEN 500 MG
1000 TABLET ORAL ONCE
Refills: 0 | Status: COMPLETED | OUTPATIENT
Start: 2024-05-23 | End: 2024-05-23

## 2024-05-23 RX ADMIN — ONDANSETRON 4 MILLIGRAM(S): 8 TABLET, FILM COATED ORAL at 08:31

## 2024-05-23 RX ADMIN — SODIUM CHLORIDE 1000 MILLILITER(S): 9 INJECTION INTRAMUSCULAR; INTRAVENOUS; SUBCUTANEOUS at 10:26

## 2024-05-23 RX ADMIN — SODIUM CHLORIDE 1000 MILLILITER(S): 9 INJECTION INTRAMUSCULAR; INTRAVENOUS; SUBCUTANEOUS at 08:31

## 2024-05-23 RX ADMIN — Medication 400 MILLIGRAM(S): at 08:31

## 2024-05-23 NOTE — ED ADULT NURSE NOTE - NSFALLRISKINTERV_ED_ALL_ED
Assistance OOB with selected safe patient handling equipment if applicable/Communicate fall risk and risk factors to all staff, patient, and family/Orthostatic vital signs/Provide visual cue: yellow wristband, yellow gown, etc/Reinforce activity limits and safety measures with patient and family/Call bell, personal items and telephone in reach/Instruct patient to call for assistance before getting out of bed/chair/stretcher/Non-slip footwear applied when patient is off stretcher/[place] to call system/Physically safe environment - no spills, clutter or unnecessary equipment/Purposeful Proactive Rounding/Room/bathroom lighting operational, light cord in reach

## 2024-05-23 NOTE — ED PROVIDER NOTE - CLINICAL SUMMARY MEDICAL DECISION MAKING FREE TEXT BOX
Pt is a 53 yo female with PMH of DM2 presenting with N/V/D. Pt reports that she had several episodes of vomiting and diarrhea since yesterday morning, and has vomited after eating. Pt notes that her sister has also been vomiting and with diarrhea and has been in contact with her. Pt also had an episode of LOC after dizziness at home at night and another on her way here.  Pt denies fever, SOB, chest pain, urinary symptoms.     VS shows mild hypotension likely due to dehydration. Given fluids, zofran, cbc, cmp, lipase, ofirmev. Has sick contacts, likely gastritis.    ekg for syncope, likely due to dehydration and gastritis. Pt is a 53 yo female with PMH of DM2 presenting with N/V/D. Pt reports that she had several episodes of vomiting and diarrhea since yesterday morning, and has vomited after eating. Pt notes that her sister has also been vomiting and with diarrhea and has been in contact with her. Pt also had an episode of LOC after dizziness at home at night and another on her way here.  Pt denies fever, SOB, chest pain, urinary symptoms.     VS shows mild hypotension likely due to dehydration. Given fluids, zofran, cbc, cmp, lipase, ofirmev. Has sick contacts, likely gastritis.    ekg for syncope, likely due to dehydration and gastritis.    Pt feels improved after fluids and medication. Pt has sick contacts at home, most likely gastritis.

## 2024-05-23 NOTE — ED ADULT NURSE NOTE - CHIEF COMPLAINT QUOTE
Pt walks in for triage after having vomitting and diarrhea for 2 days, and fainted 2x this AM. Son endorses pt having poor PO intake over the last 2 days. 99

## 2024-05-23 NOTE — ED ADULT NURSE NOTE - OBJECTIVE STATEMENT
Pt c/o N/V/D and chills since yesterday. Poor PO intake since symptoms started. As per family member, pt synopsized 3 times today. Denies head strike and blood thinner use. Denies fever, cp, sob. States her grandkids have the same symptoms at home.

## 2024-05-23 NOTE — ED PROVIDER NOTE - ATTENDING CONTRIBUTION TO CARE
53 yo female with PMH of DM2 presenting with N/V/D.  + sick contacts; pe awake alert heent ncat neck supple cor s1s2 lung clear abd soft nontender neuro nonfocal dx gastroenteritis

## 2024-05-23 NOTE — ED PROVIDER NOTE - PATIENT PORTAL LINK FT
You can access the FollowMyHealth Patient Portal offered by [facility] by registering at the following website: [email or web address]. By joining [employer]’s FollowMyHealth portal, you will also be able to view your health information using other applications (apps) compatible with our system.

## 2024-05-23 NOTE — ED PROVIDER NOTE - NS ED MD DISPO DISCHARGE CCDA
Patient/Caregiver provided printed discharge information. Mirvaso Counseling: Mirvaso is a topical medication which can decrease superficial blood flow where applied. Side effects are uncommon and include stinging, redness and allergic reactions.

## 2024-05-23 NOTE — ED ADULT TRIAGE NOTE - CHIEF COMPLAINT QUOTE
Pt walks in for triage after having vomitting and diarrhea for 2 days, and fainted 2x this AM. Son endorses pt having poor PO intake over the last 2 days.

## 2024-05-23 NOTE — ED PROVIDER NOTE - NSFOLLOWUPINSTRUCTIONS_ED_ALL_ED_FT
Náuseas vómitos    [place] los medicamentos para las náuseas según lo prescrito.    Las náuseas es la sensación de que tienes que vomitar. A medida que las náuseas empeoran, pueden provocar vómitos. Los vómitos aumentan el riesgo de deshidratación. Los adultos mayores y las personas con otras enfermedades o con un sistema inmunológico débil tienen un mayor riesgo de deshidratación. [place]ilia líquidos amna en cantidades pequeñas shilo frecuentes según lo tolere. Consuma alimentos blandos y fáciles de digerir en pequeñas cantidades según los tolere.    BUSQUE ATENCIÓN MÉDICA INMEDIATA SI TIENE ALGUNO DE LOS SIGUIENTES SÍNTOMAS: fiebre, incapacidad para retener suficientes líquidos, vómito sharmin o con bryce, heces negras o con bryce, aturdimiento/mareos, dolor en el pecho, dolor de vik intenso, sarpullido, dificultad para respirar, resfriado o piel húmeda, confusión, dolor al orinar o cualquier signo de deshidratación.      Nausea / Vomiting    Please take nausea medication as prescibed.     Nausea is the feeling that you have to vomit. As nausea gets worse, it can lead to vomiting. Vomiting puts you at an increased risk for dehydration. Older adults and people with other diseases or a weak immune system are at higher risk for dehydration. Drink clear fluids in small but frequent amounts as tolerated. Eat bland, easy-to-digest foods in small amounts as tolerated.    SEEK IMMEDIATE MEDICAL CARE IF YOU HAVE ANY OF THE FOLLOWING SYMPTOMS: fever, inability to keep sufficient fluids down, black or bloody vomitus, black or bloody stools, lightheadedness/dizziness, chest pain, severe headache, rash, shortness of breath, cold or clammy skin, confusion, pain with urination, or any signs of dehydration.

## 2024-05-23 NOTE — ED PROVIDER NOTE - OBJECTIVE STATEMENT
Pt is a 55 yo female with PMH of DM2 presenting with N/V/D. Pt reports that she had several episodes of vomiting and diarrhea since yesterday morning, and has vomited after eating. Pt notes that her sister has also been vomiting and with diarrhea and has been in contact with her. Pt also had an episode of LOC after dizziness at home at night and another on her way here.  Pt denies fever, SOB, chest pain, urinary symptoms.

## 2024-05-23 NOTE — ED ADULT NURSE NOTE - PAIN RATING/NUMBER SCALE (0-10): ACTIVITY
0 (no pain/absence of nonverbal indicators of pain) Implemented All Fall Risk Interventions:  [place] to call system. Call bell, personal items and telephone within reach. Instruct patient to call for assistance. Room bathroom lighting operational. Non-slip footwear when patient is off stretcher. Physically safe environment: no spills, clutter or unnecessary equipment. Stretcher in lowest position, wheels locked, appropriate side rails in place. Provide visual cue, wrist band, yellow gown, etc. Monitor gait and stability. Monitor for mental status changes and reorient to person, place, and time. Review medications for side effects contributing to fall risk. Reinforce activity limits and safety measures with patient and family.

## 2024-12-05 ENCOUNTER — EMERGENCY (EMERGENCY)
Facility: HOSPITAL | Age: 54
LOS: 1 days | Discharge: HOME | End: 2024-12-05
Attending: STUDENT IN AN ORGANIZED HEALTH CARE EDUCATION/TRAINING PROGRAM
Payer: COMMERCIAL

## 2024-12-05 VITALS
RESPIRATION RATE: 16 BRPM | DIASTOLIC BLOOD PRESSURE: 85 MMHG | SYSTOLIC BLOOD PRESSURE: 131 MMHG | WEIGHT: 135.8 LBS | HEIGHT: 62 IN | OXYGEN SATURATION: 97 % | HEART RATE: 97 BPM | TEMPERATURE: 98 F

## 2024-12-05 DIAGNOSIS — Z90.710 ACQUIRED ABSENCE OF BOTH CERVIX AND UTERUS: Chronic | ICD-10-CM

## 2024-12-05 DIAGNOSIS — Z98.890 OTHER SPECIFIED POSTPROCEDURAL STATES: Chronic | ICD-10-CM

## 2024-12-05 LAB
ALBUMIN SERPL ELPH-MCNC: 4.5 G/DL — SIGNIFICANT CHANGE UP (ref 3.3–5.2)
ALP SERPL-CCNC: 116 U/L — SIGNIFICANT CHANGE UP (ref 40–120)
ALT FLD-CCNC: 17 U/L — SIGNIFICANT CHANGE UP
ANION GAP SERPL CALC-SCNC: 14 MMOL/L — SIGNIFICANT CHANGE UP (ref 5–17)
AST SERPL-CCNC: 25 U/L — SIGNIFICANT CHANGE UP
BASOPHILS # BLD AUTO: 0.02 K/UL — SIGNIFICANT CHANGE UP (ref 0–0.2)
BASOPHILS NFR BLD AUTO: 0.2 % — SIGNIFICANT CHANGE UP (ref 0–2)
BILIRUB SERPL-MCNC: 0.3 MG/DL — LOW (ref 0.4–2)
BUN SERPL-MCNC: 15.4 MG/DL — SIGNIFICANT CHANGE UP (ref 8–20)
CALCIUM SERPL-MCNC: 10.2 MG/DL — SIGNIFICANT CHANGE UP (ref 8.4–10.5)
CHLORIDE SERPL-SCNC: 103 MMOL/L — SIGNIFICANT CHANGE UP (ref 96–108)
CO2 SERPL-SCNC: 24 MMOL/L — SIGNIFICANT CHANGE UP (ref 22–29)
CREAT SERPL-MCNC: 0.49 MG/DL — LOW (ref 0.5–1.3)
EGFR: 111 ML/MIN/1.73M2 — SIGNIFICANT CHANGE UP
EOSINOPHIL # BLD AUTO: 0.05 K/UL — SIGNIFICANT CHANGE UP (ref 0–0.5)
EOSINOPHIL NFR BLD AUTO: 0.6 % — SIGNIFICANT CHANGE UP (ref 0–6)
GLUCOSE SERPL-MCNC: 100 MG/DL — HIGH (ref 70–99)
HCT VFR BLD CALC: 41.4 % — SIGNIFICANT CHANGE UP (ref 34.5–45)
HGB BLD-MCNC: 13.9 G/DL — SIGNIFICANT CHANGE UP (ref 11.5–15.5)
IMM GRANULOCYTES NFR BLD AUTO: 0.3 % — SIGNIFICANT CHANGE UP (ref 0–0.9)
LYMPHOCYTES # BLD AUTO: 2.17 K/UL — SIGNIFICANT CHANGE UP (ref 1–3.3)
LYMPHOCYTES # BLD AUTO: 24.2 % — SIGNIFICANT CHANGE UP (ref 13–44)
MCHC RBC-ENTMCNC: 30.2 PG — SIGNIFICANT CHANGE UP (ref 27–34)
MCHC RBC-ENTMCNC: 33.6 G/DL — SIGNIFICANT CHANGE UP (ref 32–36)
MCV RBC AUTO: 90 FL — SIGNIFICANT CHANGE UP (ref 80–100)
MONOCYTES # BLD AUTO: 0.48 K/UL — SIGNIFICANT CHANGE UP (ref 0–0.9)
MONOCYTES NFR BLD AUTO: 5.4 % — SIGNIFICANT CHANGE UP (ref 2–14)
NEUTROPHILS # BLD AUTO: 6.21 K/UL — SIGNIFICANT CHANGE UP (ref 1.8–7.4)
NEUTROPHILS NFR BLD AUTO: 69.3 % — SIGNIFICANT CHANGE UP (ref 43–77)
PLATELET # BLD AUTO: 245 K/UL — SIGNIFICANT CHANGE UP (ref 150–400)
POTASSIUM SERPL-MCNC: 4.2 MMOL/L — SIGNIFICANT CHANGE UP (ref 3.5–5.3)
POTASSIUM SERPL-SCNC: 4.2 MMOL/L — SIGNIFICANT CHANGE UP (ref 3.5–5.3)
PROT SERPL-MCNC: 7.4 G/DL — SIGNIFICANT CHANGE UP (ref 6.6–8.7)
RBC # BLD: 4.6 M/UL — SIGNIFICANT CHANGE UP (ref 3.8–5.2)
RBC # FLD: 13.1 % — SIGNIFICANT CHANGE UP (ref 10.3–14.5)
SODIUM SERPL-SCNC: 141 MMOL/L — SIGNIFICANT CHANGE UP (ref 135–145)
WBC # BLD: 8.96 K/UL — SIGNIFICANT CHANGE UP (ref 3.8–10.5)
WBC # FLD AUTO: 8.96 K/UL — SIGNIFICANT CHANGE UP (ref 3.8–10.5)

## 2024-12-05 PROCEDURE — 72125 CT NECK SPINE W/O DYE: CPT | Mod: MC

## 2024-12-05 PROCEDURE — 80053 COMPREHEN METABOLIC PANEL: CPT

## 2024-12-05 PROCEDURE — T1013: CPT

## 2024-12-05 PROCEDURE — 99285 EMERGENCY DEPT VISIT HI MDM: CPT

## 2024-12-05 PROCEDURE — 72125 CT NECK SPINE W/O DYE: CPT | Mod: 26,MC

## 2024-12-05 PROCEDURE — 70498 CT ANGIOGRAPHY NECK: CPT | Mod: 26,MC

## 2024-12-05 PROCEDURE — 96372 THER/PROPH/DIAG INJ SC/IM: CPT | Mod: XU

## 2024-12-05 PROCEDURE — 99284 EMERGENCY DEPT VISIT MOD MDM: CPT | Mod: 25

## 2024-12-05 PROCEDURE — 85025 COMPLETE CBC W/AUTO DIFF WBC: CPT

## 2024-12-05 PROCEDURE — 70498 CT ANGIOGRAPHY NECK: CPT | Mod: MC

## 2024-12-05 PROCEDURE — 36415 COLL VENOUS BLD VENIPUNCTURE: CPT

## 2024-12-05 RX ORDER — METHOCARBAMOL 500 MG/1
1500 TABLET, FILM COATED ORAL ONCE
Refills: 0 | Status: COMPLETED | OUTPATIENT
Start: 2024-12-05 | End: 2024-12-05

## 2024-12-05 RX ORDER — LIDOCAINE 40 MG/G
1 CREAM TOPICAL ONCE
Refills: 0 | Status: COMPLETED | OUTPATIENT
Start: 2024-12-05 | End: 2024-12-05

## 2024-12-05 RX ORDER — KETOROLAC TROMETHAMINE 30 MG/ML
15 INJECTION INTRAMUSCULAR; INTRAVENOUS ONCE
Refills: 0 | Status: DISCONTINUED | OUTPATIENT
Start: 2024-12-05 | End: 2024-12-05

## 2024-12-05 RX ADMIN — KETOROLAC TROMETHAMINE 15 MILLIGRAM(S): 30 INJECTION INTRAMUSCULAR; INTRAVENOUS at 19:47

## 2024-12-05 RX ADMIN — LIDOCAINE 1 PATCH: 40 CREAM TOPICAL at 19:47

## 2024-12-05 RX ADMIN — METHOCARBAMOL 1500 MILLIGRAM(S): 500 TABLET, FILM COATED ORAL at 19:47
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
## 2024-12-05 NOTE — ED PROVIDER NOTE - NSFOLLOWUPINSTRUCTIONS_ED_ALL_ED_FT
- Please follow-up with your primary care doctor in the next 1-2 days.  Please call tomorrow for an appointment.  If you cannot follow-up with your primary care doctor please return to the ED for any urgent issues.  - You were given a copy of the tests performed today.  Please bring the results with you and review them with your primary care doctor.  - If you have any worsening of symptoms or any other concerns please return to the ED immediately.  - Please continue taking your home medications as directed.   - Take robaxin as prescribed. Avoid taking medication while driving or if drinking alcohol as it can cause drowsiness.     - Flaca un seguimiento con quiroz médico de atención primaria en los próximos 1 o 2 días.  Por favor llame mañana para demarco [place].  Si no puede realizar un seguimiento con quiroz médico de atención primaria, regrese al servicio de urgencias si tiene algún problema urgente.  - Le entregaron demarco copia de las pruebas realizadas hoy.  Traiga los resultados con usted y revíselos con quiroz médico de atención primaria.  - Si aman síntomas empeoran o tiene alguna otra inquietud, regrese al servicio de urgencias de inmediato.  - Continúe tomando aman medicamentos caseros según las indicaciones.   - [place] robaxina según lo prescrito. Evite [place] medicamentos mientras conduce o si beulah alcohol, ya que puede provocar somnolencia.

## 2024-12-05 NOTE — ED PROVIDER NOTE - PATIENT PORTAL LINK FT
You can access the FollowMyHealth Patient Portal offered by [facility] by registering at the following website: [email or web address]. By joining [employer]’s FollowMyHealth portal, you will also be able to view your health information using other applications (apps) compatible with our system.
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
## 2024-12-05 NOTE — ED ADULT NURSE NOTE - NSFALLUNIVINTERV_ED_ALL_ED
Bed/Stretcher in lowest position, wheels locked, appropriate side rails in place/Call bell, personal items and telephone in reach/Instruct patient to call for assistance before getting out of bed/chair/stretcher/Non-slip footwear applied when patient is off stretcher/[place] to call system/Physically safe environment - no spills, clutter or unnecessary equipment/Purposeful proactive rounding/Room/bathroom lighting operational, light cord in reach

## 2024-12-05 NOTE — ED PROVIDER NOTE - PHYSICAL EXAMINATION
Gen: No acute distress, non toxic  HEENT: NCAT. Mucous membranes moist, pink conjunctivae, EOMI. PERRL. Airway patent.   Neck: No midline ttp.   CV: RRR, nl s1/s2. no ecchymosis. no chest wall ttp.   Resp: CTAB, normal rate and effort. no wheezes, rhonchi or crackles. equal expansion b/l.   GI: Abdomen soft, NT, ND. No rebound, no guarding  Neuro: A&O x4, MAEx4. 5/5 str ext x 4. Sensation intact, symmetric throughout. No fnd's.   MSK: No midline spinal or joint ttp. +ttp over the rt SCM and rt trap. FROM UE and LE b/l. compartments soft/compressible.   Skin: No rashes. intact and perfused. cap refill <2sec  Vascular: Radial pulses 2+ b/l.
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
## 2024-12-05 NOTE — ED ADULT NURSE NOTE - DRUG PRE-SCREENING (DAST -1)
Occupational Therapy    Visit Type: treatment  SUBJECTIVE  Patient agreed to participate in therapy this date.    Pt non verbal throughout session   Patient / Family Goal: return to previous functional status, maximize function and return home    Pain   Patient reports pain is not an issue/concern., Patient does not demonstrate pain behaviors.    OBJECTIVE     Cognitive Status   Orientation    - Unable to assess  Functional Communication   - Overall Status: impaired        Bed Mobility  - Supine to sit: not attempted due to not medically appropriate or safe (2^ lethargy )      Activities of Daily Living (ADLs)  Eating:   - Assist: total assist - dependent   Grooming/Oral Hygiene:   - Grooming assist: total assist - dependent   Lower Body Dressing:   - Footwear:       - Assistance: total assist - dependent   Toileting:   - Assist: total assist - dependent   Interventions      Additional exercise details: B UE PROM/AAROM in all appropriate planes  Training provided: ADL training  Hand over hand initiation of grooming tasks with no signs of carryover or continuation   Skilled input: verbal instruction/cues, tactile instruction/cues and facilitation  Verbal Consent: Writer verbally educated and received verbal consent for hand placement, positioning of patient, and techniques to be performed today from patient for therapist position for techniques as described above and how they are pertinent to the patient's plan of care.         Education:   - Present and ready to learn: patient  Education provided during session:  - Role of OT, plan of care, fall and safety precautions while in hospital    - Results of above outlined education: Verbalizes understanding, Demonstrates understanding and Needs reinforcement    ASSESSMENT   Progress: progressing toward goals  Interferring components: decreased activity tolerance and decreased insight into deficit    Summary of function and discharge needs based on today's assessment:  -  Current level of function: significantly below baseline level of function  - Activities of daily living (ADLs) requiring support at discharge: transfers, dressing, grooming, bathing, toileting and ambulation  - Impairments that require further therapy intervention: strength, activity tolerance, balance, safety awareness and cognition  AM-PAC  - Prior Level of Function: Needs a little help ([facility] 12-21)       Key: MOD A=moderate assistance, IND/MOD I=independent/modified independent  - Generalized Current Level of Function     - Current Self-Cares: 6       Scoring Key= >21 Modified Independent; 20-21 Supervision; 18-19 Minimal assist; 13-18 Moderate assist; 9-12 Max assist; <9 Total assist    Therapy Diagnosis:   Decreased ability to perform self care tasks and functional transfers.     A minimum of 8 minutes per session x 1 week in the acute setting.           PLAN  Suggestions for next session as indicated:   OT Frequency: 3-5 x per week      PT/OT ADL Equipment for Discharge: currently recommend ADLs be performed at bed level    Agreement to plan and goals: patient agrees with goals and treatment plan      GOALS  Review Date: 6/7/2023  Long Term Goals: (to be met by time of discharge from hospital)  Grooming: Patient will complete grooming tasks in standing stand by assist.  Status: progressing/ongoing  Toileting: Patient will complete toileting stand by assist.  Status: progressing/ongoing  Toilet transfer: Patient will complete toilet transfer with gait belt and 2-wheeled walker, commode over toilet, stand by assist.   Status: progressing/ongoing        Documented in the chart in the following areas: Assessment/Plan.    Patient at End of Session:   Location: in bed  Safety measures: alarm system in place/re-engaged and call light within reach  Handoff to: nurse and nurse assistant      Therapy procedure time and total treatment time can be found documented on the Time Entry flowsheet   Statement Selected

## 2025-01-02 NOTE — ED ADULT NURSE NOTE - NSFALLLASTSIX_ED_ALL_ED
----- Message from Jami SNYDER sent at 1/2/2025 11:06 AM EST -----  Regarding: [facility] Appointment Request  [facility] Appointment Request    Patient needs appointment for [facility] Appointment Type: New Patient.    Patient Requested Dates(s): Mid of January and early of February   Patient Requested Time: Anytime  Provider Name: Amanda Reena    Reason for Appointment Request: New Patient - Requested Provider unavailable  --------------------------------------------------------------------------------------------------------------------------    Relationship to Patient: Self     Call Back Information: OK to leave message on voicemail  Preferred Call Back Number: Phone    Yes.

## 2025-01-12 ENCOUNTER — EMERGENCY (EMERGENCY)
Facility: HOSPITAL | Age: 55
LOS: 1 days | Discharge: LEFT BEFORE DISCHARGE | End: 2025-01-12
Payer: COMMERCIAL

## 2025-01-12 VITALS
OXYGEN SATURATION: 100 % | HEART RATE: 76 BPM | HEIGHT: 62 IN | RESPIRATION RATE: 17 BRPM | TEMPERATURE: 97 F | SYSTOLIC BLOOD PRESSURE: 130 MMHG | DIASTOLIC BLOOD PRESSURE: 73 MMHG

## 2025-01-12 DIAGNOSIS — Z90.710 ACQUIRED ABSENCE OF BOTH CERVIX AND UTERUS: Chronic | ICD-10-CM

## 2025-01-12 DIAGNOSIS — Z98.890 OTHER SPECIFIED POSTPROCEDURAL STATES: Chronic | ICD-10-CM

## 2025-01-12 PROCEDURE — 99283 EMERGENCY DEPT VISIT LOW MDM: CPT | Mod: 25

## 2025-01-12 PROCEDURE — 93005 ELECTROCARDIOGRAM TRACING: CPT

## 2025-01-12 PROCEDURE — L9991: CPT

## 2025-01-12 PROCEDURE — 93010 ELECTROCARDIOGRAM REPORT: CPT

## 2025-01-12 NOTE — ED ADULT TRIAGE NOTE - SOURCE OF INFORMATION
F/u on [facility] provided in June??- dr. Mukherjee,[facility],  today and prior to f/u Provide time and date of labs to pt 
Patient

## 2025-04-13 NOTE — PATIENT PROFILE ADULT - MEDICATIONS/VISITS
Problem: Pain  Goal: Verbalizes/displays adequate comfort level or baseline comfort level  Outcome: Progressing     Problem: Safety - Adult  Goal: Free from fall injury  Outcome: Progressing  Flowsheets (Taken 4/12/2025 2000)  Free From Fall Injury: Based on caregiver fall risk screen, instruct family/caregiver to ask for assistance with transferring infant if caregiver noted to have fall risk factors     Problem: Safety - Medical Restraint  Goal: Remains free of injury from restraints (Restraint for Interference with Medical Device)  Description: INTERVENTIONS:1. Determine that other, less restrictive measures have been tried or would not be effective before applying the restraint2. Evaluate the patient's condition at the time of restraint application3. Inform patient/family regarding the reason for restraint4. Q2H: Monitor safety, psychosocial status, comfort, nutrition and hydration  Outcome: Progressing  Flowsheets  Taken 4/12/2025 2000 by Alysha Rudolph RN  Remains free of injury from restraints (restraint for interference with medical device): Every 2 hours: Monitor safety, psychosocial status, comfort, nutrition and hydration  Taken 4/12/2025 1859 by Julissa Naylor RN  Remains free of injury from restraints (restraint for interference with medical device):   Determine that other, less restrictive measures have been tried or would not be effective before applying the restraint   Evaluate the patient's condition at the time of restraint application   Inform patient/family regarding the reason for restraint   Every 2 hours: Monitor safety, psychosocial status, comfort, nutrition and hydration     Problem: Skin/Tissue Integrity  Goal: Skin integrity remains intact  Description: 1.  Monitor for areas of redness and/or skin breakdown2.  Assess vascular access sites hourly3.  Every 4-6 hours minimum:  Change oxygen saturation probe site4.  Every 4-6 hours:  If on nasal continuous positive airway  pressure, respiratory therapy assess nares and determine need for appliance change or resting period  Outcome: Progressing     Problem: ABCDS Injury Assessment  Goal: Absence of physical injury  Outcome: Progressing  Flowsheets (Taken 4/12/2025 2000)  Absence of Physical Injury: Implement safety measures based on patient assessment      no

## 2025-07-23 ENCOUNTER — OFFICE (OUTPATIENT)
Dept: URBAN - METROPOLITAN AREA CLINIC 112 | Facility: CLINIC | Age: 55
Setting detail: OPHTHALMOLOGY
End: 2025-07-23
Payer: COMMERCIAL

## 2025-07-23 ENCOUNTER — RX ONLY (RX ONLY)
Age: 55
End: 2025-07-23

## 2025-07-23 DIAGNOSIS — H43.393: ICD-10-CM

## 2025-07-23 DIAGNOSIS — E11.9: ICD-10-CM

## 2025-07-23 PROCEDURE — 92134 CPTRZ OPH DX IMG PST SGM RTA: CPT | Performed by: OPHTHALMOLOGY

## 2025-07-23 PROCEDURE — 92014 COMPRE OPH EXAM EST PT 1/>: CPT | Performed by: OPHTHALMOLOGY

## 2025-07-23 ASSESSMENT — KERATOMETRY
OD_K1POWER_DIOPTERS: 45.00
OS_K2POWER_DIOPTERS: 46.50
OD_K2POWER_DIOPTERS: 45.50
OS_AXISANGLE_DEGREES: 121
OD_AXISANGLE_DEGREES: 082
OS_K1POWER_DIOPTERS: 45.25

## 2025-07-23 ASSESSMENT — REFRACTION_AUTOREFRACTION
OS_AXIS: 081
OS_CYLINDER: -0.50
OD_AXIS: 084
OS_SPHERE: +1.50
OD_CYLINDER: -0.25
OD_SPHERE: +1.50

## 2025-07-23 ASSESSMENT — REFRACTION_CURRENTRX
OD_SPHERE: +1.25
OS_VPRISM_DIRECTION: PROGS
OD_ADD: +1.75
OD_VPRISM_DIRECTION: PROGS
OS_AXIS: 091
OS_SPHERE: +1.75
OS_OVR_VA: 20/
OS_ADD: +1.75
OS_CYLINDER: -1.25
OD_OVR_VA: 20/

## 2025-07-23 ASSESSMENT — CONFRONTATIONAL VISUAL FIELD TEST (CVF)
OD_FINDINGS: FULL
OS_FINDINGS: FULL

## 2025-07-23 ASSESSMENT — VISUAL ACUITY
OS_BCVA: 20/20-1
OD_BCVA: 20/20

## 2025-07-23 ASSESSMENT — TONOMETRY
OD_IOP_MMHG: 20
OS_IOP_MMHG: 20

## (undated) DEVICE — LIGASURE MARYLAND 37CM

## (undated) DEVICE — POSITIONER PINK PAD PIGAZZI SYSTEM

## (undated) DEVICE — SYR CONTROL LUER LOK 10CC

## (undated) DEVICE — PREP CHLORAPREP HI-LITE ORANGE 26ML

## (undated) DEVICE — UTERINE MANIPULATOR THOMAS MEDICAL 4.5MM

## (undated) DEVICE — PACK GYN LAPAROSCOPY

## (undated) DEVICE — PREP DYNA-HEX CHG 4% 4OZ BOTTLE (BACTOSHIELD)

## (undated) DEVICE — TUBING STRYKEFLOW II SUCTION / IRRIGATOR

## (undated) DEVICE — INSUFFLATION NDL ETHICON ENDOPATH PNEUMOPARITONEUM 120MM

## (undated) DEVICE — TROCAR COVIDIEN VERSAPORT BLADELESS OPTICAL 11MM STANDARD

## (undated) DEVICE — WARMING BLANKET UPPER ADULT

## (undated) DEVICE — PREP TRAY DRY SKIN PREP SCRUB

## (undated) DEVICE — SUT VICRYL 4-0 18" PS-2 UNDYED

## (undated) DEVICE — SYR LUER LOK 10CC

## (undated) DEVICE — SOL IRR POUR H2O 1000ML

## (undated) DEVICE — ELCTR CORD FOOTSWITCH 1PLR LAPSCP 10FT

## (undated) DEVICE — DRSG STERISTRIPS 0.5 X 4"

## (undated) DEVICE — GLV 7 PROTEXIS (WHITE)

## (undated) DEVICE — SOL IRR POUR NS 0.9% 1000ML

## (undated) DEVICE — VENODYNE/SCD SLEEVE CALF MEDIUM

## (undated) DEVICE — TROCAR COVIDIEN VERSAPORT BLADELESS OPTICAL 5MM STANDARD

## (undated) DEVICE — FOLEY TRAY 16FR 5CC LF UMETER CLOSED

## (undated) DEVICE — DRSG MASTISOL